# Patient Record
Sex: FEMALE | Race: WHITE | HISPANIC OR LATINO
[De-identification: names, ages, dates, MRNs, and addresses within clinical notes are randomized per-mention and may not be internally consistent; named-entity substitution may affect disease eponyms.]

---

## 2017-01-09 ENCOUNTER — APPOINTMENT (OUTPATIENT)
Dept: INTERNAL MEDICINE | Facility: CLINIC | Age: 32
End: 2017-01-09

## 2017-01-09 VITALS
HEIGHT: 61 IN | WEIGHT: 116 LBS | BODY MASS INDEX: 21.9 KG/M2 | HEART RATE: 70 BPM | DIASTOLIC BLOOD PRESSURE: 75 MMHG | SYSTOLIC BLOOD PRESSURE: 108 MMHG

## 2017-03-23 ENCOUNTER — OUTPATIENT (OUTPATIENT)
Dept: OUTPATIENT SERVICES | Facility: HOSPITAL | Age: 32
LOS: 1 days | Discharge: HOME | End: 2017-03-23

## 2017-03-23 ENCOUNTER — RESULT CHARGE (OUTPATIENT)
Age: 32
End: 2017-03-23

## 2017-03-23 ENCOUNTER — APPOINTMENT (OUTPATIENT)
Dept: OBGYN | Facility: CLINIC | Age: 32
End: 2017-03-23

## 2017-03-23 VITALS
SYSTOLIC BLOOD PRESSURE: 100 MMHG | HEIGHT: 61 IN | WEIGHT: 115 LBS | DIASTOLIC BLOOD PRESSURE: 60 MMHG | BODY MASS INDEX: 21.71 KG/M2

## 2017-03-24 LAB
HCG UR QL: NEGATIVE
QUALITY CONTROL: YES

## 2017-03-30 ENCOUNTER — RX RENEWAL (OUTPATIENT)
Age: 32
End: 2017-03-30

## 2017-03-30 LAB
A VAGINAE DNA VAG NAA+PROBE-LOG#: NOT DETECTED
BV SCORE VAG QL NAA+PROBE: ABNORMAL
C GLABRATA DNA VAG QL NAA+PROBE: NOT DETECTED
C PARAP DNA VAG QL NAA+PROBE: NOT DETECTED
C TRACH RRNA SPEC QL NAA+PROBE: NOT DETECTED
C TROPICLS DNA VAG QL NAA+PROBE: NOT DETECTED
CANDIDA DNA VAG QL NAA+PROBE: NOT DETECTED
G VAGINALIS DNA VAG NAA+PROBE-LOG#: 6.8
LACTOBACILLUS DNA VAG NAA+PROBE-LOG#: 7.6
MEGASPHAERA SP DNA VAG NAA+PROBE-LOG#: NOT DETECTED
N GONORRHOEA RRNA SPEC QL NAA+PROBE: NOT DETECTED
T VAGINALIS RRNA SPEC QL NAA+PROBE: NOT DETECTED

## 2017-04-05 ENCOUNTER — RX RENEWAL (OUTPATIENT)
Age: 32
End: 2017-04-05

## 2017-04-13 ENCOUNTER — APPOINTMENT (OUTPATIENT)
Dept: OBGYN | Facility: CLINIC | Age: 32
End: 2017-04-13

## 2017-04-24 ENCOUNTER — APPOINTMENT (OUTPATIENT)
Dept: INTERNAL MEDICINE | Facility: CLINIC | Age: 32
End: 2017-04-24

## 2017-06-27 DIAGNOSIS — N76.0 ACUTE VAGINITIS: ICD-10-CM

## 2017-07-21 ENCOUNTER — APPOINTMENT (OUTPATIENT)
Dept: OBGYN | Facility: CLINIC | Age: 32
End: 2017-07-21

## 2017-08-03 ENCOUNTER — OUTPATIENT (OUTPATIENT)
Dept: OUTPATIENT SERVICES | Facility: HOSPITAL | Age: 32
LOS: 1 days | Discharge: HOME | End: 2017-08-03

## 2017-08-03 ENCOUNTER — APPOINTMENT (OUTPATIENT)
Dept: OBGYN | Facility: CLINIC | Age: 32
End: 2017-08-03

## 2017-08-03 VITALS
WEIGHT: 118 LBS | DIASTOLIC BLOOD PRESSURE: 60 MMHG | SYSTOLIC BLOOD PRESSURE: 94 MMHG | BODY MASS INDEX: 22.28 KG/M2 | HEIGHT: 61 IN

## 2017-08-03 DIAGNOSIS — L02.31 CUTANEOUS ABSCESS OF BUTTOCK: ICD-10-CM

## 2017-08-03 DIAGNOSIS — L03.90 CELLULITIS, UNSPECIFIED: ICD-10-CM

## 2017-08-04 LAB
BILIRUB UR QL STRIP: NEGATIVE
CLARITY UR: CLEAR
COLLECTION METHOD: NORMAL
GLUCOSE UR-MCNC: NEGATIVE
HCG UR QL: NEGATIVE EU/DL
HGB UR QL STRIP.AUTO: NEGATIVE
KETONES UR-MCNC: NEGATIVE
LEUKOCYTE ESTERASE UR QL STRIP: NEGATIVE
NITRITE UR QL STRIP: NEGATIVE
PH UR STRIP: 6
PROT UR STRIP-MCNC: NEGATIVE
SP GR UR STRIP: 1.02

## 2017-08-05 ENCOUNTER — RESULT REVIEW (OUTPATIENT)
Age: 32
End: 2017-08-05

## 2017-08-09 ENCOUNTER — APPOINTMENT (OUTPATIENT)
Dept: ANTEPARTUM | Facility: CLINIC | Age: 32
End: 2017-08-09

## 2017-08-09 DIAGNOSIS — O36.80X0 PREGNANCY WITH INCONCLUSIVE FETAL VIABILITY, NOT APPLICABLE OR UNSPECIFIED: ICD-10-CM

## 2017-08-09 DIAGNOSIS — Z34.00 ENCOUNTER FOR SUPERVISION OF NORMAL FIRST PREGNANCY, UNSPECIFIED TRIMESTER: ICD-10-CM

## 2017-08-09 LAB
A VAGINAE DNA VAG NAA+PROBE-LOG#: NOT DETECTED
BV SCORE VAG QL NAA+PROBE: ABNORMAL
C GLABRATA DNA VAG QL NAA+PROBE: NOT DETECTED
C PARAP DNA VAG QL NAA+PROBE: NOT DETECTED
C TRACH RRNA SPEC QL NAA+PROBE: NOT DETECTED
C TROPICLS DNA VAG QL NAA+PROBE: NOT DETECTED
CANDIDA DNA VAG QL NAA+PROBE: NOT DETECTED
G VAGINALIS DNA VAG NAA+PROBE-LOG#: 6.2
LACTOBACILLUS DNA VAG NAA+PROBE-LOG#: 7.1
MEGASPHAERA SP DNA VAG NAA+PROBE-LOG#: NOT DETECTED
N GONORRHOEA RRNA SPEC QL NAA+PROBE: NOT DETECTED
T VAGINALIS RRNA SPEC QL NAA+PROBE: NOT DETECTED

## 2017-08-16 LAB
HPV I/H RISK 1 DNA CVX QL PROBE+SIG AMP: DETECTED
HPV16 DNA CVX QL PROBE+SIG AMP: NOT DETECTED
HPV16 DNA CVX QL PROBE+SIG AMP: NOT DETECTED

## 2017-08-23 ENCOUNTER — OUTPATIENT (OUTPATIENT)
Dept: OUTPATIENT SERVICES | Facility: HOSPITAL | Age: 32
LOS: 1 days | Discharge: HOME | End: 2017-08-23

## 2017-08-23 ENCOUNTER — APPOINTMENT (OUTPATIENT)
Dept: ANTEPARTUM | Facility: CLINIC | Age: 32
End: 2017-08-23

## 2017-08-23 DIAGNOSIS — L02.31 CUTANEOUS ABSCESS OF BUTTOCK: ICD-10-CM

## 2017-08-23 DIAGNOSIS — L03.90 CELLULITIS, UNSPECIFIED: ICD-10-CM

## 2017-08-24 ENCOUNTER — APPOINTMENT (OUTPATIENT)
Dept: OBGYN | Facility: CLINIC | Age: 32
End: 2017-08-24

## 2017-08-24 ENCOUNTER — OUTPATIENT (OUTPATIENT)
Dept: OUTPATIENT SERVICES | Facility: HOSPITAL | Age: 32
LOS: 1 days | Discharge: HOME | End: 2017-08-24

## 2017-08-24 DIAGNOSIS — L03.90 CELLULITIS, UNSPECIFIED: ICD-10-CM

## 2017-08-24 DIAGNOSIS — L02.31 CUTANEOUS ABSCESS OF BUTTOCK: ICD-10-CM

## 2017-08-28 ENCOUNTER — OUTPATIENT (OUTPATIENT)
Dept: OUTPATIENT SERVICES | Facility: HOSPITAL | Age: 32
LOS: 1 days | Discharge: HOME | End: 2017-08-28

## 2017-08-28 DIAGNOSIS — L03.90 CELLULITIS, UNSPECIFIED: ICD-10-CM

## 2017-08-28 DIAGNOSIS — L02.31 CUTANEOUS ABSCESS OF BUTTOCK: ICD-10-CM

## 2017-08-31 ENCOUNTER — APPOINTMENT (OUTPATIENT)
Dept: OBGYN | Facility: CLINIC | Age: 32
End: 2017-08-31

## 2017-09-01 DIAGNOSIS — Z3A.08 8 WEEKS GESTATION OF PREGNANCY: ICD-10-CM

## 2017-09-01 DIAGNOSIS — N85.4 MALPOSITION OF UTERUS: ICD-10-CM

## 2017-09-01 DIAGNOSIS — O02.1 MISSED ABORTION: ICD-10-CM

## 2017-09-11 ENCOUNTER — RESULT REVIEW (OUTPATIENT)
Age: 32
End: 2017-09-11

## 2017-09-11 LAB
ABO + RH BLD: NORMAL
BASOPHILS # BLD: 0.01 TH/MM3
BASOPHILS NFR BLD: 0.1 %
BLD GP AB SCN SERPL QL: NEGATIVE
DIFFERENTIAL METHOD BLD: NORMAL
EOSINOPHIL # BLD: 0.22 TH/MM3
EOSINOPHIL NFR BLD: 3.2 %
ERYTHROCYTE [DISTWIDTH] IN BLOOD BY AUTOMATED COUNT: 14.9 %
GRANULOCYTES # BLD: 4.33 TH/MM3
GRANULOCYTES NFR BLD: 62.2 %
HCT VFR BLD AUTO: 39.4 %
HGB BLD-MCNC: 12.7 G/DL
IMM GRANULOCYTES # BLD: 0.02 TH/MM3
IMM GRANULOCYTES NFR BLD: 0.3 %
LYMPHOCYTES # BLD: 1.9 TH/MM3
LYMPHOCYTES NFR BLD: 27.3 %
MCH RBC QN AUTO: 29.5 PG
MCHC RBC AUTO-ENTMCNC: 32.2 G/DL
MCV RBC AUTO: 91.4 FL
MONOCYTES # BLD: 0.48 TH/MM3
MONOCYTES NFR BLD: 6.9 %
PLATELET # BLD: 254 TH/MM3
PMV BLD AUTO: 9.7 FL
RBC # BLD AUTO: 4.31 MIL/MM3
WBC # BLD: 6.96 TH/MM3

## 2017-09-12 ENCOUNTER — APPOINTMENT (OUTPATIENT)
Dept: OBGYN | Facility: CLINIC | Age: 32
End: 2017-09-12

## 2017-09-12 ENCOUNTER — OUTPATIENT (OUTPATIENT)
Dept: OUTPATIENT SERVICES | Facility: HOSPITAL | Age: 32
LOS: 1 days | Discharge: HOME | End: 2017-09-12

## 2017-09-12 VITALS — DIASTOLIC BLOOD PRESSURE: 60 MMHG | BODY MASS INDEX: 22.3 KG/M2 | SYSTOLIC BLOOD PRESSURE: 100 MMHG | WEIGHT: 118 LBS

## 2017-09-12 DIAGNOSIS — L03.90 CELLULITIS, UNSPECIFIED: ICD-10-CM

## 2017-09-12 DIAGNOSIS — L02.31 CUTANEOUS ABSCESS OF BUTTOCK: ICD-10-CM

## 2018-03-06 ENCOUNTER — OUTPATIENT (OUTPATIENT)
Dept: OUTPATIENT SERVICES | Facility: HOSPITAL | Age: 33
LOS: 1 days | Discharge: HOME | End: 2018-03-06

## 2018-03-06 ENCOUNTER — APPOINTMENT (OUTPATIENT)
Dept: OBGYN | Facility: CLINIC | Age: 33
End: 2018-03-06

## 2018-03-06 VITALS
BODY MASS INDEX: 23.06 KG/M2 | SYSTOLIC BLOOD PRESSURE: 100 MMHG | DIASTOLIC BLOOD PRESSURE: 70 MMHG | WEIGHT: 122.13 LBS | HEIGHT: 61 IN

## 2018-03-06 RX ADMIN — NORGESTIMATE AND ETHINYL ESTRADIOL 1 MG-25 MCG: KIT at 00:00

## 2018-03-07 DIAGNOSIS — Z30.9 ENCOUNTER FOR CONTRACEPTIVE MANAGEMENT, UNSPECIFIED: ICD-10-CM

## 2018-08-07 ENCOUNTER — OUTPATIENT (OUTPATIENT)
Dept: OUTPATIENT SERVICES | Facility: HOSPITAL | Age: 33
LOS: 1 days | Discharge: HOME | End: 2018-08-07

## 2018-08-07 ENCOUNTER — APPOINTMENT (OUTPATIENT)
Dept: OBGYN | Facility: CLINIC | Age: 33
End: 2018-08-07

## 2018-08-07 VITALS
DIASTOLIC BLOOD PRESSURE: 72 MMHG | WEIGHT: 122 LBS | HEIGHT: 61 IN | BODY MASS INDEX: 23.03 KG/M2 | SYSTOLIC BLOOD PRESSURE: 120 MMHG

## 2018-08-08 DIAGNOSIS — Z01.419 ENCOUNTER FOR GYNECOLOGICAL EXAMINATION (GENERAL) (ROUTINE) WITHOUT ABNORMAL FINDINGS: ICD-10-CM

## 2018-08-14 LAB — HPV HIGH+LOW RISK DNA PNL CVX: NOT DETECTED

## 2018-08-21 ENCOUNTER — APPOINTMENT (OUTPATIENT)
Dept: ANTEPARTUM | Facility: CLINIC | Age: 33
End: 2018-08-21

## 2018-08-21 DIAGNOSIS — R10.2 PELVIC AND PERINEAL PAIN: ICD-10-CM

## 2018-08-21 DIAGNOSIS — D25.1 INTRAMURAL LEIOMYOMA OF UTERUS: ICD-10-CM

## 2018-08-21 DIAGNOSIS — D25.2 SUBSEROSAL LEIOMYOMA OF UTERUS: ICD-10-CM

## 2018-08-22 ENCOUNTER — OUTPATIENT (OUTPATIENT)
Dept: OUTPATIENT SERVICES | Facility: HOSPITAL | Age: 33
LOS: 1 days | Discharge: HOME | End: 2018-08-22

## 2018-08-22 ENCOUNTER — APPOINTMENT (OUTPATIENT)
Dept: INTERNAL MEDICINE | Facility: CLINIC | Age: 33
End: 2018-08-22

## 2018-08-22 VITALS
HEART RATE: 77 BPM | BODY MASS INDEX: 23.03 KG/M2 | WEIGHT: 122 LBS | TEMPERATURE: 98.1 F | HEIGHT: 61 IN | SYSTOLIC BLOOD PRESSURE: 103 MMHG | DIASTOLIC BLOOD PRESSURE: 58 MMHG

## 2018-08-22 DIAGNOSIS — L03.317 CELLULITIS OF BUTTOCK: ICD-10-CM

## 2018-08-22 DIAGNOSIS — L02.439 CARBUNCLE OF LIMB, UNSPECIFIED: ICD-10-CM

## 2018-08-22 RX ORDER — NORGESTIMATE AND ETHINYL ESTRADIOL 7DAYSX3 LO
0.18/0.215/0.25 KIT ORAL DAILY
Qty: 28 | Refills: 5 | Status: DISCONTINUED | OUTPATIENT
Start: 2017-09-12 | End: 2018-08-22

## 2018-08-22 RX ORDER — MULTIVITAMIN
TABLET ORAL
Refills: 0 | Status: ACTIVE | COMMUNITY

## 2018-08-22 RX ORDER — TERCONAZOLE 4 MG/G
0.4 CREAM VAGINAL
Qty: 1 | Refills: 0 | Status: DISCONTINUED | COMMUNITY
Start: 2017-03-23 | End: 2018-08-22

## 2018-08-22 RX ORDER — NORGESTIMATE AND ETHINYL ESTRADIOL 7DAYSX3 LO
0.18/0.215/0.25 KIT ORAL DAILY
Qty: 1 | Refills: 5 | Status: DISCONTINUED | COMMUNITY
Start: 2018-08-07 | End: 2018-08-22

## 2018-08-22 NOTE — REVIEW OF SYSTEMS
[Negative] : Heme/Lymph [FreeTextEntry4] : food feels stuck in the thraot [FreeTextEntry9] : facial itching with tylenol

## 2018-08-22 NOTE — HISTORY OF PRESENT ILLNESS
[de-identified] : 33  yoF with pmhx of recurrent vulvovaginitis, miscarriage X 1 presents for follow up. Pt c/o dizziness, describes as spinning sensation three weeks ago when she gets up in the morning and she bends down which resolved spontaneously. erythematous and pruritus rash on the neck two weeks ago. Rash resolved with Claritin. \par Pt c/o that she had a cold two months ago, took Nightquail and developed facial itching. Pt also had facial itching with Tylenol. Pt had these medication before without problem but recently she developed facial itching when taking these medications. \par Pt also c/o feeling food is stuck in her throat occasionally. \par Pt denies fever, chills, headache, dizziness, CP, SOB, abd pain, Vomiting, diarrhea, dysuria. \par \par \par \par

## 2018-08-22 NOTE — ASSESSMENT
[FreeTextEntry1] : \par 34 yo F with pmhx as listed presents for follow up. \par \par 1. allergic reaction to tylenol, rash on neck resolved with claritin\par - send to allergist\par \par 2. Dizziness likely 2/2 viral infection\par - resolved\par \par 3. HCM\par - routine labs\par - UTD with pap smear\par

## 2018-08-22 NOTE — PHYSICAL EXAM
[No Acute Distress] : no acute distress [Well Nourished] : well nourished [Well Developed] : well developed [Well-Appearing] : well-appearing [Normal Sclera/Conjunctiva] : normal sclera/conjunctiva [PERRL] : pupils equal round and reactive to light [EOMI] : extraocular movements intact [Normal Outer Ear/Nose] : the outer ears and nose were normal in appearance [Normal Oropharynx] : the oropharynx was normal [No JVD] : no jugular venous distention [Supple] : supple [No Lymphadenopathy] : no lymphadenopathy [Thyroid Normal, No Nodules] : the thyroid was normal and there were no nodules present [No Respiratory Distress] : no respiratory distress  [Clear to Auscultation] : lungs were clear to auscultation bilaterally [No Accessory Muscle Use] : no accessory muscle use [Normal Rate] : normal rate  [Regular Rhythm] : with a regular rhythm [Normal S1, S2] : normal S1 and S2 [No Murmur] : no murmur heard [No Carotid Bruits] : no carotid bruits [No Edema] : there was no peripheral edema [No Extremity Clubbing/Cyanosis] : no extremity clubbing/cyanosis [Soft] : abdomen soft [Non Tender] : non-tender [Non-distended] : non-distended [No Masses] : no abdominal mass palpated [No HSM] : no HSM [Normal Bowel Sounds] : normal bowel sounds [Normal Anterior Cervical Nodes] : no anterior cervical lymphadenopathy [No CVA Tenderness] : no CVA  tenderness [No Joint Swelling] : no joint swelling [Grossly Normal Strength/Tone] : grossly normal strength/tone [No Rash] : no rash [Normal Gait] : normal gait [Coordination Grossly Intact] : coordination grossly intact [No Focal Deficits] : no focal deficits [Deep Tendon Reflexes (DTR)] : deep tendon reflexes were 2+ and symmetric [Normal Affect] : the affect was normal [Normal Insight/Judgement] : insight and judgment were intact

## 2018-08-23 ENCOUNTER — LABORATORY RESULT (OUTPATIENT)
Age: 33
End: 2018-08-23

## 2018-08-23 LAB — HIV1+2 AB SPEC QL IA.RAPID: NONREACTIVE

## 2018-08-24 LAB
ALBUMIN SERPL ELPH-MCNC: 4.3 G/DL
ALP BLD-CCNC: 55 U/L
ALT SERPL-CCNC: 18 U/L
ANION GAP SERPL CALC-SCNC: 17 MMOL/L
AST SERPL-CCNC: 22 U/L
BASOPHILS # BLD AUTO: 0.01 K/UL
BASOPHILS NFR BLD AUTO: 0.2 %
BILIRUB SERPL-MCNC: 0.4 MG/DL
BUN SERPL-MCNC: 10 MG/DL
CALCIUM SERPL-MCNC: 9.9 MG/DL
CHLORIDE SERPL-SCNC: 101 MMOL/L
CHOLEST SERPL-MCNC: 189 MG/DL
CHOLEST/HDLC SERPL: 2.6 RATIO
CO2 SERPL-SCNC: 22 MMOL/L
CREAT SERPL-MCNC: 0.6 MG/DL
EOSINOPHIL # BLD AUTO: 0.13 K/UL
EOSINOPHIL NFR BLD AUTO: 2 %
GLUCOSE SERPL-MCNC: 86 MG/DL
HCT VFR BLD CALC: 40.4 %
HDLC SERPL-MCNC: 73 MG/DL
HGB BLD-MCNC: 12.6 G/DL
IMM GRANULOCYTES NFR BLD AUTO: 0.6 %
LDLC SERPL CALC-MCNC: 101 MG/DL
LYMPHOCYTES # BLD AUTO: 1.96 K/UL
LYMPHOCYTES NFR BLD AUTO: 30.5 %
MAN DIFF?: NORMAL
MCHC RBC-ENTMCNC: 29.2 PG
MCHC RBC-ENTMCNC: 31.2 G/DL
MCV RBC AUTO: 93.7 FL
MONOCYTES # BLD AUTO: 0.31 K/UL
MONOCYTES NFR BLD AUTO: 4.8 %
NEUTROPHILS # BLD AUTO: 3.97 K/UL
NEUTROPHILS NFR BLD AUTO: 61.9 %
PLATELET # BLD AUTO: 248 K/UL
POTASSIUM SERPL-SCNC: 4.4 MMOL/L
PROT SERPL-MCNC: 7 G/DL
RBC # BLD: 4.31 M/UL
RBC # FLD: 13.7 %
SODIUM SERPL-SCNC: 140 MMOL/L
TRIGL SERPL-MCNC: 169 MG/DL
TSH SERPL-ACNC: 1.81 UIU/ML
WBC # FLD AUTO: 6.42 K/UL

## 2018-09-04 ENCOUNTER — APPOINTMENT (OUTPATIENT)
Dept: OBGYN | Facility: CLINIC | Age: 33
End: 2018-09-04

## 2018-09-04 ENCOUNTER — OUTPATIENT (OUTPATIENT)
Dept: OUTPATIENT SERVICES | Facility: HOSPITAL | Age: 33
LOS: 1 days | Discharge: HOME | End: 2018-09-04

## 2018-09-04 VITALS
BODY MASS INDEX: 23.03 KG/M2 | WEIGHT: 122.01 LBS | DIASTOLIC BLOOD PRESSURE: 62 MMHG | HEIGHT: 61 IN | SYSTOLIC BLOOD PRESSURE: 100 MMHG

## 2018-09-04 DIAGNOSIS — O02.1 MISSED ABORTION: ICD-10-CM

## 2018-09-04 DIAGNOSIS — Z01.419 ENCOUNTER FOR GYNECOLOGICAL EXAMINATION (GENERAL) (ROUTINE) W/OUT ABNORMAL FINDINGS: ICD-10-CM

## 2018-09-05 DIAGNOSIS — Z30.9 ENCOUNTER FOR CONTRACEPTIVE MANAGEMENT, UNSPECIFIED: ICD-10-CM

## 2018-09-05 PROBLEM — Z01.419 WELL WOMAN EXAM WITH ROUTINE GYNECOLOGICAL EXAM: Status: RESOLVED | Noted: 2018-08-07 | Resolved: 2018-09-05

## 2018-09-05 PROBLEM — O02.1 MISSED ABORTION: Status: RESOLVED | Noted: 2017-08-24 | Resolved: 2018-09-05

## 2018-10-31 ENCOUNTER — OUTPATIENT (OUTPATIENT)
Dept: OUTPATIENT SERVICES | Facility: HOSPITAL | Age: 33
LOS: 1 days | Discharge: HOME | End: 2018-10-31

## 2018-10-31 ENCOUNTER — APPOINTMENT (OUTPATIENT)
Dept: INTERNAL MEDICINE | Facility: CLINIC | Age: 33
End: 2018-10-31

## 2018-10-31 VITALS
HEIGHT: 61 IN | DIASTOLIC BLOOD PRESSURE: 71 MMHG | HEART RATE: 75 BPM | BODY MASS INDEX: 23.6 KG/M2 | SYSTOLIC BLOOD PRESSURE: 103 MMHG | TEMPERATURE: 97 F | WEIGHT: 125 LBS

## 2018-10-31 NOTE — HISTORY OF PRESENT ILLNESS
[FreeTextEntry1] : routine follow up [de-identified] : 34 yo female with h/o prediabetes comes for routine follow up.\par she wants to know the result of blood test which were drawn in the previous visit.\par she doesnot have any present complaints except the persistent c/o food sticking on the throat.\par agrees for flu shot.\par LMP 2 weeks back.

## 2018-10-31 NOTE — ASSESSMENT
[FreeTextEntry1] : 34 yo female with h/o prediabetes comes for routine follow up.\par she wants to know the result of blood test which were drawn in the previous visit.\par she doesnot have any present complaints except the persistent c/o food sticking on the throat, no regurgitation.\par agrees for flu shot.\par LMP 2 weeks back.\par 1)prediabetes a1c5.3 [id number] refer to dietician\par Dysphagia:\par refer to gastroenterology\par \par HCM\par flu shot\par \par rto in 6 months and prn\par \par Prediabetes\par dietitian\par repeat labs\par lifestyle modification\par \par

## 2018-11-01 DIAGNOSIS — R73.03 PREDIABETES: ICD-10-CM

## 2018-11-01 DIAGNOSIS — R13.19 OTHER DYSPHAGIA: ICD-10-CM

## 2018-11-01 DIAGNOSIS — E78.1 PURE HYPERGLYCERIDEMIA: ICD-10-CM

## 2018-11-12 ENCOUNTER — APPOINTMENT (OUTPATIENT)
Dept: NUTRITION | Facility: CLINIC | Age: 33
End: 2018-11-12

## 2018-11-12 VITALS — HEIGHT: 61 IN | BODY MASS INDEX: 23.6 KG/M2 | WEIGHT: 125 LBS

## 2018-12-21 ENCOUNTER — APPOINTMENT (OUTPATIENT)
Dept: GASTROENTEROLOGY | Facility: CLINIC | Age: 33
End: 2018-12-21

## 2019-03-05 ENCOUNTER — OUTPATIENT (OUTPATIENT)
Dept: OUTPATIENT SERVICES | Facility: HOSPITAL | Age: 34
LOS: 1 days | Discharge: HOME | End: 2019-03-05

## 2019-03-05 ENCOUNTER — APPOINTMENT (OUTPATIENT)
Dept: OBGYN | Facility: CLINIC | Age: 34
End: 2019-03-05

## 2019-03-05 VITALS — BODY MASS INDEX: 24 KG/M2 | WEIGHT: 127 LBS | SYSTOLIC BLOOD PRESSURE: 110 MMHG | DIASTOLIC BLOOD PRESSURE: 60 MMHG

## 2019-03-06 NOTE — PHYSICAL EXAM
[Normal] : uterus [Labia Majora] : labia major [Labia Minora] : labia minora [No Bleeding] : there was no active vaginal bleeding [Uterine Adnexae] : were not tender and not enlarged [Discharge] : had no discharge [Motion Tenderness] : there was no cervical motion tenderness

## 2019-03-08 DIAGNOSIS — N92.6 IRREGULAR MENSTRUATION, UNSPECIFIED: ICD-10-CM

## 2019-03-18 ENCOUNTER — APPOINTMENT (OUTPATIENT)
Dept: ANTEPARTUM | Facility: CLINIC | Age: 34
End: 2019-03-18

## 2019-03-19 ENCOUNTER — APPOINTMENT (OUTPATIENT)
Dept: OBGYN | Facility: CLINIC | Age: 34
End: 2019-03-19

## 2019-03-19 ENCOUNTER — LABORATORY RESULT (OUTPATIENT)
Age: 34
End: 2019-03-19

## 2019-03-19 ENCOUNTER — OUTPATIENT (OUTPATIENT)
Dept: OUTPATIENT SERVICES | Facility: HOSPITAL | Age: 34
LOS: 1 days | Discharge: HOME | End: 2019-03-19

## 2019-03-19 ENCOUNTER — RESULT CHARGE (OUTPATIENT)
Age: 34
End: 2019-03-19

## 2019-03-19 VITALS
HEIGHT: 61 IN | SYSTOLIC BLOOD PRESSURE: 122 MMHG | DIASTOLIC BLOOD PRESSURE: 78 MMHG | BODY MASS INDEX: 23.98 KG/M2 | WEIGHT: 127 LBS

## 2019-03-19 DIAGNOSIS — R10.2 PELVIC AND PERINEAL PAIN: ICD-10-CM

## 2019-03-19 LAB — TSH SERPL-ACNC: 1.58 UIU/ML

## 2019-03-19 NOTE — PROCEDURE
[Endometrial Biopsy] : Endometrial biopsy [Irregular Bleeding] : irregular uterine bleeding [Risks] : risks [Benefits] : benefits [Alternatives] : alternatives [Patient] : patient [Infection] : infection [Bleeding] : bleeding [Allergic Reaction] : allergic reaction [Uterine Perforation] : uterine perforation [Pain] : pain [CONSENT OBTAINED] : written consent was obtained prior to the procedure. [LMP ___] : LMP was [unfilled] [Neg Pregnancy Test] : a pregnancy test was negative [Betadine] : Betadine [None] : none [Easy Passage] : allowed easy passage of a uterine sound without dilation [Sounded to ___ cm] : sounded to [unfilled] ~Ucm [Anteverted] : anteverted [Pipelle] : a Pipelle endometrial suction curette [Scant] : a scant [Sent to Histology] : the specimen was place in buffered formalin and sent for pathlogy [Tolerated Well] : the patient tolerated the procedure well [No Complications] : there were no complications [de-identified] : None

## 2019-03-20 DIAGNOSIS — N93.9 ABNORMAL UTERINE AND VAGINAL BLEEDING, UNSPECIFIED: ICD-10-CM

## 2019-03-20 DIAGNOSIS — N92.6 IRREGULAR MENSTRUATION, UNSPECIFIED: ICD-10-CM

## 2019-03-20 LAB
HCG UR QL: NEGATIVE
QUALITY CONTROL: YES

## 2019-04-02 ENCOUNTER — APPOINTMENT (OUTPATIENT)
Dept: OBGYN | Facility: CLINIC | Age: 34
End: 2019-04-02

## 2019-04-02 ENCOUNTER — OUTPATIENT (OUTPATIENT)
Dept: OUTPATIENT SERVICES | Facility: HOSPITAL | Age: 34
LOS: 1 days | Discharge: HOME | End: 2019-04-02

## 2019-04-02 VITALS
BODY MASS INDEX: 23.6 KG/M2 | DIASTOLIC BLOOD PRESSURE: 80 MMHG | HEIGHT: 61 IN | SYSTOLIC BLOOD PRESSURE: 124 MMHG | WEIGHT: 125.02 LBS

## 2019-04-02 DIAGNOSIS — N76.0 ACUTE VAGINITIS: ICD-10-CM

## 2019-04-02 NOTE — PHYSICAL EXAM
Message   Recorded as Task   Date: 09/01/2016 12:08 PM, Created By: Angela Day   Task Name: Follow Up   Assigned To: Glad to Have You File   Regarding Patient: Magi Nayak, Status: In Progress   CommentCosimo Knife - 01 Sep 2016 12:08 PM     TASK CREATED  There is a small cyst on the left ovary which appears consistent with corpus luteum - this is benign and associated with recent ovulation  Despite the cyst being small it may be the cause of her LLQ discomfort  Other possible causes may include GI etiology such as constipation, as we discussed at her office visit  She may use NSAIDs for pain as needed and should call if sx become more bothersome, and report to the ED if sx are severe  I will mail order slip for f/u US, to be performed in 2 mos  Please notify patient of results by phone and review reasons to call  Thanks! Kendrick Mask - 01 Sep 2016 12:25 PM     TASK IN PROGRESS   Kendrick Mask - 01 Sep 2016 12:26 PM     TASK EDITED   lmtcb   Rosanna Lou - 21 Sep 2016 8:49 AM     TASK EDITED  lm forpt tcb for ander's information   Andreina Gerardo - 02 Sep 2016 9:25 AM     TASK EDITED                 reviewed results and discussed recommendations  u/s mailed to patient  Active Problems    1  Encounter for gynecological examination without abnormal finding (V72 31) (Z01 419)   2  Encounter for screening mammogram for malignant neoplasm of breast (V76 12)   (Z12 31)   3  Essential hypertension (401 9) (I10)   4  Fatigue (780 79) (R53 83)   5  Herniated nucleus pulposus, L4-5 (722 10) (M51 26)   6  Hypokalemia (276 8) (E87 6)   7  Influenza vaccine needed (V04 81) (Z23)   8  Left lower quadrant pain (789 04) (R10 32)   9  Lumbar radiculopathy (724 4) (M54 16)   10  Malodorous urine (791 9) (R82 90)   11  Medial epicondylitis (726 31) (M77 00)   12  Need for prophylactic vaccination and inoculation against influenza (V04 81) (Z23)   13  Obesity (278 00) (E66 9)   14   Ovarian cyst, right (620  2) (N83 20)   15  Pigmented nevus (216 9) (D22 9)   16  Sacroiliitis (720 2) (M46 1)   17  Screening for lipoid disorders (V77 91) (Z13 220)   18  Tuberculosis screening (V74 1) (Z11 1)   19  Vaginal candidiasis (112 1) (B37 3)   20  Vitamin D deficiency (268 9) (E55 9)    Current Meds   1  AmLODIPine Besylate 2 5 MG Oral Tablet; TAKE 1 TABLET DAILY AS DIRECTED; Therapy: 07NWK6532 to (Evaluate:21May2017)  Requested for: 92AIR4164; Last   Rx:[id number] Ordered   2  EQL Vitamin D3 1000 UNIT Oral Tablet; Therapy: (Recorded:24Oct2013) to Recorded   3  Fluconazole 150 MG Oral Tablet; diflucan 150mg,,,take 1 today and repeat in 3 days; Therapy: 46Nex8620 to (Evaluate:82Toc1140); Last Rx:[id number] Ordered   4  Lisinopril-Hydrochlorothiazide 20-25 MG Oral Tablet; TAKE 1 TABLET DAILY; Therapy: 05UGJ2380 to (Evaluate:21May2017)  Requested for: 93FWB7963; Last   Rx:[id number] Ordered   5  Meloxicam 7 5 MG Oral Tablet; TAKE 1 TABLET DAILY AS NEEDED; Therapy: 24Tsp0863 to (Grazyna De Leon)  Requested for: 42Wwm8115; Last   Rx:[id number] Ordered   6  Nitrofurantoin Monohyd Macro 100 MG Oral Capsule (Macrobid); TAKE 1 CAPSULE   EVERY 12 HOURS DAILY; Therapy: 30ZDI2341 to (Evaluate:07Yoj6858); Last Rx:[id number] Ordered   7  Potassium Chloride ER 10 MEQ Oral Capsule Extended Release; TAKE 1 CAPSULE   DAILY; Therapy: 11INC1992 to (Evaluate:02Upv4267)  Requested for: 22Vmb0090; Last   Rx:[id number] Ordered    Allergies    1  No Known Drug Allergies    2   Seasonal    Signatures   Electronically signed by : Gracie Dunn LPN; Sep  2 0174  9:68XF EST                       (Author) [Normal] : uterus [Discharge] : a  ~M vaginal discharge was present [Moderate] : moderate [White] : white [Thick] : thick [No Bleeding] : there was no active vaginal bleeding [Uterine Adnexae] : were not tender and not enlarged

## 2019-04-05 DIAGNOSIS — N93.9 ABNORMAL UTERINE AND VAGINAL BLEEDING, UNSPECIFIED: ICD-10-CM

## 2019-04-05 DIAGNOSIS — B37.9 CANDIDIASIS, UNSPECIFIED: ICD-10-CM

## 2019-04-10 LAB
A VAGINAE DNA VAG QL NAA+PROBE: ABNORMAL
BVAB2 DNA VAG QL NAA+PROBE: NORMAL
C KRUSEI DNA VAG QL NAA+PROBE: NEGATIVE
C TRACH RRNA SPEC QL NAA+PROBE: NEGATIVE
MEGA1 DNA VAG QL NAA+PROBE: ABNORMAL
N GONORRHOEA RRNA SPEC QL NAA+PROBE: NEGATIVE
T VAGINALIS RRNA SPEC QL NAA+PROBE: NEGATIVE

## 2019-04-15 ENCOUNTER — APPOINTMENT (OUTPATIENT)
Dept: NUTRITION | Facility: CLINIC | Age: 34
End: 2019-04-15

## 2019-04-22 ENCOUNTER — APPOINTMENT (OUTPATIENT)
Dept: INTERNAL MEDICINE | Facility: CLINIC | Age: 34
End: 2019-04-22

## 2019-08-13 ENCOUNTER — APPOINTMENT (OUTPATIENT)
Dept: OBGYN | Facility: CLINIC | Age: 34
End: 2019-08-13

## 2019-10-23 LAB
HCG UR QL: NEGATIVE
QUALITY CONTROL: YES

## 2019-11-21 ENCOUNTER — APPOINTMENT (OUTPATIENT)
Dept: OBGYN | Facility: CLINIC | Age: 34
End: 2019-11-21
Payer: COMMERCIAL

## 2019-11-21 ENCOUNTER — OUTPATIENT (OUTPATIENT)
Dept: OUTPATIENT SERVICES | Facility: HOSPITAL | Age: 34
LOS: 1 days | Discharge: HOME | End: 2019-11-21

## 2019-11-21 VITALS
BODY MASS INDEX: 23.79 KG/M2 | SYSTOLIC BLOOD PRESSURE: 126 MMHG | WEIGHT: 126 LBS | HEIGHT: 61 IN | DIASTOLIC BLOOD PRESSURE: 78 MMHG

## 2019-11-21 DIAGNOSIS — Z87.42 PERSONAL HISTORY OF OTHER DISEASES OF THE FEMALE GENITAL TRACT: ICD-10-CM

## 2019-11-21 PROCEDURE — 99395 PREV VISIT EST AGE 18-39: CPT

## 2019-11-21 RX ORDER — FLUCONAZOLE 150 MG/1
150 TABLET ORAL
Qty: 2 | Refills: 1 | Status: DISCONTINUED | COMMUNITY
Start: 2019-04-02 | End: 2019-11-21

## 2019-11-21 RX ORDER — NORGESTIMATE AND ETHINYL ESTRADIOL 7DAYSX3 LO
KIT ORAL
Refills: 0 | Status: DISCONTINUED | COMMUNITY
End: 2019-11-21

## 2019-11-21 NOTE — PHYSICAL EXAM
[Awake] : awake [Alert] : alert [Soft] : soft [Oriented x3] : oriented to person, place, and time [Labia Majora] : labia major [Labia Minora] : labia minora [Normal] : clitoris [No Bleeding] : there was no active vaginal bleeding [Normal Position] : in a normal position [Uterine Adnexae] : were not tender and not enlarged [RRR, No Murmurs] : RRR, no murmurs [Acute Distress] : no acute distress [LAD] : no lymphadenopathy [Thyroid Nodule] : no thyroid nodule [Goiter] : no goiter [Mass] : no breast mass [Nipple Discharge] : no nipple discharge [Axillary LAD] : no axillary lymphadenopathy [Tender] : non tender [Distended] : not distended [H/Smegaly] : no hepatosplenomegaly [Depressed Mood] : not depressed [Flat Affect] : affect not flat [Discharge] : had no discharge [Motion Tenderness] : there was no cervical motion tenderness [Tenderness] : nontender

## 2019-11-21 NOTE — HISTORY OF PRESENT ILLNESS
[Definite:  ___ (Date)] : the last menstrual period was [unfilled] [Sexually Active] : is sexually active [Monogamous] : is monogamous [Contraception] : uses contraception [Condoms] : uses condoms [Male ___] : [unfilled] male [Spotting Between  Menses] : no spotting between menses

## 2019-11-26 DIAGNOSIS — Z01.419 ENCOUNTER FOR GYNECOLOGICAL EXAMINATION (GENERAL) (ROUTINE) WITHOUT ABNORMAL FINDINGS: ICD-10-CM

## 2019-11-26 DIAGNOSIS — F32.9 MAJOR DEPRESSIVE DISORDER, SINGLE EPISODE, UNSPECIFIED: ICD-10-CM

## 2020-06-16 ENCOUNTER — OUTPATIENT (OUTPATIENT)
Dept: OUTPATIENT SERVICES | Facility: HOSPITAL | Age: 35
LOS: 1 days | Discharge: HOME | End: 2020-06-16

## 2020-06-16 ENCOUNTER — APPOINTMENT (OUTPATIENT)
Dept: OBGYN | Facility: CLINIC | Age: 35
End: 2020-06-16
Payer: COMMERCIAL

## 2020-06-16 VITALS
BODY MASS INDEX: 23.6 KG/M2 | SYSTOLIC BLOOD PRESSURE: 120 MMHG | HEIGHT: 61 IN | WEIGHT: 125 LBS | DIASTOLIC BLOOD PRESSURE: 74 MMHG

## 2020-06-16 DIAGNOSIS — Z01.419 ENCOUNTER FOR GYNECOLOGICAL EXAMINATION (GENERAL) (ROUTINE) W/OUT ABNORMAL FINDINGS: ICD-10-CM

## 2020-06-16 DIAGNOSIS — Z86.59 PERSONAL HISTORY OF OTHER MENTAL AND BEHAVIORAL DISORDERS: ICD-10-CM

## 2020-06-16 DIAGNOSIS — B37.3 CANDIDIASIS OF VULVA AND VAGINA: ICD-10-CM

## 2020-06-16 DIAGNOSIS — Z12.72 ENCOUNTER FOR SCREENING FOR MALIGNANT NEOPLASM OF VAGINA: ICD-10-CM

## 2020-06-16 DIAGNOSIS — N76.0 ACUTE VAGINITIS: ICD-10-CM

## 2020-06-16 DIAGNOSIS — Z30.41 ENCOUNTER FOR SURVEILLANCE OF CONTRACEPTIVE PILLS: ICD-10-CM

## 2020-06-16 DIAGNOSIS — R10.2 PELVIC AND PERINEAL PAIN: ICD-10-CM

## 2020-06-16 DIAGNOSIS — Z87.42 PERSONAL HISTORY OF OTHER DISEASES OF THE FEMALE GENITAL TRACT: ICD-10-CM

## 2020-06-16 DIAGNOSIS — Z88.9 ALLERGY STATUS TO UNSPECIFIED DRUGS, MEDICAMENTS AND BIOLOGICAL SUBSTANCES: ICD-10-CM

## 2020-06-16 PROCEDURE — 99212 OFFICE O/P EST SF 10 MIN: CPT

## 2020-09-01 ENCOUNTER — APPOINTMENT (OUTPATIENT)
Dept: OBGYN | Facility: CLINIC | Age: 35
End: 2020-09-01
Payer: COMMERCIAL

## 2020-09-01 ENCOUNTER — OUTPATIENT (OUTPATIENT)
Dept: OUTPATIENT SERVICES | Facility: HOSPITAL | Age: 35
LOS: 1 days | Discharge: HOME | End: 2020-09-01

## 2020-09-01 ENCOUNTER — RESULT CHARGE (OUTPATIENT)
Age: 35
End: 2020-09-01

## 2020-09-01 VITALS
WEIGHT: 127 LBS | HEIGHT: 61 IN | BODY MASS INDEX: 23.98 KG/M2 | SYSTOLIC BLOOD PRESSURE: 108 MMHG | DIASTOLIC BLOOD PRESSURE: 70 MMHG

## 2020-09-01 DIAGNOSIS — B96.89 ACUTE VAGINITIS: ICD-10-CM

## 2020-09-01 DIAGNOSIS — N76.0 ACUTE VAGINITIS: ICD-10-CM

## 2020-09-01 LAB
HCG UR QL: NEGATIVE
QUALITY CONTROL: YES

## 2020-09-01 PROCEDURE — 99212 OFFICE O/P EST SF 10 MIN: CPT

## 2020-09-01 RX ADMIN — NORETHINDRONE AND ETHINYL ESTRADIOL 0 MG-MCG: 1; .035 TABLET ORAL at 00:00

## 2020-09-01 NOTE — COUNSELING
[Breast Self Exam] : breast self exam [Exercise] : exercise [Vitamins/Supplements] : vitamins/supplements [STD (testing, results, tx)] : STD (testing, results, tx) [Contraception] : contraception [Emergency Contraception] : emergency contraception [Method Specific Consent] : method specific consent

## 2020-09-01 NOTE — HISTORY OF PRESENT ILLNESS
[Definite:  ___ (Date)] : the last menstrual period was [unfilled] [Normal Amount/Duration] : was of a normal amount and duration [Regular Cycle Intervals] : periods have been regular [Sexually Active] : is sexually active [Monogamous] : is monogamous [Menstrual Cramps] : no menstrual cramps

## 2020-12-03 ENCOUNTER — APPOINTMENT (OUTPATIENT)
Dept: OBGYN | Facility: CLINIC | Age: 35
End: 2020-12-03

## 2021-08-14 ENCOUNTER — OUTPATIENT (OUTPATIENT)
Dept: OUTPATIENT SERVICES | Facility: HOSPITAL | Age: 36
LOS: 1 days | Discharge: HOME | End: 2021-08-14

## 2021-08-14 ENCOUNTER — APPOINTMENT (OUTPATIENT)
Dept: INTERNAL MEDICINE | Facility: CLINIC | Age: 36
End: 2021-08-14
Payer: COMMERCIAL

## 2021-08-14 ENCOUNTER — NON-APPOINTMENT (OUTPATIENT)
Age: 36
End: 2021-08-14

## 2021-08-14 VITALS
BODY MASS INDEX: 23.66 KG/M2 | HEART RATE: 74 BPM | SYSTOLIC BLOOD PRESSURE: 101 MMHG | WEIGHT: 125.31 LBS | DIASTOLIC BLOOD PRESSURE: 69 MMHG | HEIGHT: 61 IN | TEMPERATURE: 98.6 F | RESPIRATION RATE: 20 BRPM

## 2021-08-14 DIAGNOSIS — Z86.2 PERSONAL HISTORY OF DISEASES OF THE BLOOD AND BLOOD-FORMING ORGANS AND CERTAIN DISORDERS INVOLVING THE IMMUNE MECHANISM: ICD-10-CM

## 2021-08-14 DIAGNOSIS — Z83.3 FAMILY HISTORY OF DIABETES MELLITUS: ICD-10-CM

## 2021-08-14 DIAGNOSIS — H66.90 OTITIS MEDIA, UNSPECIFIED, UNSPECIFIED EAR: ICD-10-CM

## 2021-08-14 DIAGNOSIS — R73.03 PREDIABETES.: ICD-10-CM

## 2021-08-14 PROCEDURE — 99204 OFFICE O/P NEW MOD 45 MIN: CPT | Mod: GC

## 2021-08-14 RX ORDER — METRONIDAZOLE 500 MG/1
500 TABLET ORAL TWICE DAILY
Qty: 14 | Refills: 1 | Status: COMPLETED | COMMUNITY
Start: 2020-06-24 | End: 2021-08-14

## 2021-08-16 DIAGNOSIS — E04.1 NONTOXIC SINGLE THYROID NODULE: ICD-10-CM

## 2021-08-16 DIAGNOSIS — R73.03 PREDIABETES: ICD-10-CM

## 2021-08-16 DIAGNOSIS — Z86.59 PERSONAL HISTORY OF OTHER MENTAL AND BEHAVIORAL DISORDERS: ICD-10-CM

## 2021-08-16 DIAGNOSIS — Z86.2 PERSONAL HISTORY OF DISEASES OF THE BLOOD AND BLOOD-FORMING ORGANS AND CERTAIN DISORDERS INVOLVING THE IMMUNE MECHANISM: ICD-10-CM

## 2021-08-16 DIAGNOSIS — H66.90 OTITIS MEDIA, UNSPECIFIED, UNSPECIFIED EAR: ICD-10-CM

## 2021-08-16 DIAGNOSIS — Z83.3 FAMILY HISTORY OF DIABETES MELLITUS: ICD-10-CM

## 2021-08-16 DIAGNOSIS — Z00.00 ENCOUNTER FOR GENERAL ADULT MEDICAL EXAMINATION WITHOUT ABNORMAL FINDINGS: ICD-10-CM

## 2021-08-18 ENCOUNTER — APPOINTMENT (OUTPATIENT)
Dept: OBGYN | Facility: CLINIC | Age: 36
End: 2021-08-18
Payer: COMMERCIAL

## 2021-08-18 ENCOUNTER — OUTPATIENT (OUTPATIENT)
Dept: OUTPATIENT SERVICES | Facility: HOSPITAL | Age: 36
LOS: 1 days | Discharge: HOME | End: 2021-08-18

## 2021-08-18 VITALS
WEIGHT: 132 LBS | HEIGHT: 61 IN | BODY MASS INDEX: 24.92 KG/M2 | DIASTOLIC BLOOD PRESSURE: 66 MMHG | SYSTOLIC BLOOD PRESSURE: 98 MMHG

## 2021-08-18 DIAGNOSIS — N75.0 CYST OF BARTHOLIN'S GLAND: ICD-10-CM

## 2021-08-18 LAB
ALBUMIN SERPL ELPH-MCNC: 4.3 G/DL
ALP BLD-CCNC: 78 U/L
ALT SERPL-CCNC: 16 U/L
ANION GAP SERPL CALC-SCNC: 12 MMOL/L
AST SERPL-CCNC: 19 U/L
BASOPHILS # BLD AUTO: 0.01 K/UL
BASOPHILS NFR BLD AUTO: 0.2 %
BILIRUB SERPL-MCNC: 0.4 MG/DL
BUN SERPL-MCNC: 9 MG/DL
CALCIUM SERPL-MCNC: 9.9 MG/DL
CHLORIDE SERPL-SCNC: 105 MMOL/L
CO2 SERPL-SCNC: 22 MMOL/L
CREAT SERPL-MCNC: 0.5 MG/DL
EOSINOPHIL # BLD AUTO: 0.2 K/UL
EOSINOPHIL NFR BLD AUTO: 3 %
ESTIMATED AVERAGE GLUCOSE: 103 MG/DL
GLUCOSE SERPL-MCNC: 98 MG/DL
HBA1C MFR BLD HPLC: 5.2 %
HCT VFR BLD CALC: 40.6 %
HGB BLD-MCNC: 13.4 G/DL
IMM GRANULOCYTES NFR BLD AUTO: 0.3 %
LYMPHOCYTES # BLD AUTO: 1.88 K/UL
LYMPHOCYTES NFR BLD AUTO: 28.3 %
MAN DIFF?: NORMAL
MCHC RBC-ENTMCNC: 30.9 PG
MCHC RBC-ENTMCNC: 33 G/DL
MCV RBC AUTO: 93.5 FL
MONOCYTES # BLD AUTO: 0.42 K/UL
MONOCYTES NFR BLD AUTO: 6.3 %
NEUTROPHILS # BLD AUTO: 4.12 K/UL
NEUTROPHILS NFR BLD AUTO: 61.9 %
PLATELET # BLD AUTO: 179 K/UL
POTASSIUM SERPL-SCNC: 4.1 MMOL/L
PROT SERPL-MCNC: 7 G/DL
RBC # BLD: 4.34 M/UL
RBC # FLD: 12.1 %
SODIUM SERPL-SCNC: 139 MMOL/L
TSH SERPL-ACNC: 1.93 UIU/ML
WBC # FLD AUTO: 6.65 K/UL

## 2021-08-18 PROCEDURE — 99395 PREV VISIT EST AGE 18-39: CPT

## 2021-08-18 RX ADMIN — NORETHINDRONE AND ETHINYL ESTRADIOL 0 MG-MCG: KIT at 00:00

## 2021-08-18 NOTE — HISTORY OF PRESENT ILLNESS
[FreeTextEntry1] : re-establish care [de-identified] : 37 y/o female with History of prediabetes , dyslipidemia , anemia,  is present for medical follow up  she c/o clogged ears and ear pain and reports feeling a thyroid nodule. She is otherwise stable ,  denies fever, chills, headache,  n/v/d/ , dizziness, Chest pain or , SOB.

## 2021-08-18 NOTE — PHYSICAL EXAM
[No Acute Distress] : no acute distress [Well-Appearing] : well-appearing [Normal Sclera/Conjunctiva] : normal sclera/conjunctiva [No JVD] : no jugular venous distention [No Respiratory Distress] : no respiratory distress  [Clear to Auscultation] : lungs were clear to auscultation bilaterally [Normal Rate] : normal rate  [Regular Rhythm] : with a regular rhythm [No Edema] : there was no peripheral edema [Soft] : abdomen soft [Non Tender] : non-tender [No CVA Tenderness] : no CVA  tenderness [No Joint Swelling] : no joint swelling [No Rash] : no rash [Non-distended] : non-distended [de-identified] : cloudy , bulging TM , otalgia

## 2021-08-18 NOTE — ASSESSMENT
[FreeTextEntry1] : 35 y/o female with History of prediabetes , dyslipidemia , anemia,  is present for medical follow up  she c/o clogged ears and ear pain and reports feeling a thyroid nodule. She is otherwise stable ,  denies fever, chills, headache,  n/v/d/ , dizziness, Chest pain or , SOB. \par \par # Thyroid nodule \par -US thyroid + parathyroid \par \par # Ear infection\par - Augmentin ABX \par \par # hx Anemia\par - check CBC , iron study \par \par # dyslipidemia \par - counseled on diet management and lifestyle modifications.\par -check lipid panel \par  \par # HCM\par - check CBC, CMP , lipid panel , hgbA1C, TSH , vit D \par - due for pap smear - referred to GYN \par - RTC in 3 months

## 2021-08-21 LAB
25(OH)D3 SERPL-MCNC: 17 NG/ML
CHOLEST SERPL-MCNC: 172 MG/DL
HCV AB SER QL: NONREACTIVE
HCV S/CO RATIO: 0.22 S/CO
HDLC SERPL-MCNC: 59 MG/DL
LDLC SERPL CALC-MCNC: 96 MG/DL
NONHDLC SERPL-MCNC: 113 MG/DL
TRIGL SERPL-MCNC: 83 MG/DL

## 2021-08-23 LAB
A VAGINAE DNA VAG QL NAA+PROBE: NORMAL
BVAB2 DNA VAG QL NAA+PROBE: NORMAL
C KRUSEI DNA VAG QL NAA+PROBE: NEGATIVE
C TRACH RRNA SPEC QL NAA+PROBE: NEGATIVE
HPV HIGH+LOW RISK DNA PNL CVX: NOT DETECTED
MEGA1 DNA VAG QL NAA+PROBE: NORMAL
N GONORRHOEA RRNA SPEC QL NAA+PROBE: NEGATIVE
T VAGINALIS RRNA SPEC QL NAA+PROBE: NEGATIVE

## 2021-08-31 LAB — CYTOLOGY CVX/VAG DOC THIN PREP: NORMAL

## 2021-09-08 ENCOUNTER — RESULT REVIEW (OUTPATIENT)
Age: 36
End: 2021-09-08

## 2021-09-08 ENCOUNTER — OUTPATIENT (OUTPATIENT)
Dept: OUTPATIENT SERVICES | Facility: HOSPITAL | Age: 36
LOS: 1 days | Discharge: HOME | End: 2021-09-08
Payer: SUBSIDIZED

## 2021-09-08 DIAGNOSIS — E04.1 NONTOXIC SINGLE THYROID NODULE: ICD-10-CM

## 2021-09-08 PROCEDURE — 76536 US EXAM OF HEAD AND NECK: CPT | Mod: 26

## 2021-11-10 ENCOUNTER — OUTPATIENT (OUTPATIENT)
Dept: OUTPATIENT SERVICES | Facility: HOSPITAL | Age: 36
LOS: 1 days | Discharge: HOME | End: 2021-11-10

## 2021-11-10 ENCOUNTER — APPOINTMENT (OUTPATIENT)
Dept: INTERNAL MEDICINE | Facility: CLINIC | Age: 36
End: 2021-11-10
Payer: COMMERCIAL

## 2021-11-10 ENCOUNTER — NON-APPOINTMENT (OUTPATIENT)
Age: 36
End: 2021-11-10

## 2021-11-10 VITALS
DIASTOLIC BLOOD PRESSURE: 68 MMHG | OXYGEN SATURATION: 98 % | HEART RATE: 78 BPM | BODY MASS INDEX: 24.7 KG/M2 | HEIGHT: 61 IN | WEIGHT: 130.8 LBS | SYSTOLIC BLOOD PRESSURE: 100 MMHG | TEMPERATURE: 98.9 F

## 2021-11-10 DIAGNOSIS — E04.1 NONTOXIC SINGLE THYROID NODULE: ICD-10-CM

## 2021-11-10 PROCEDURE — 99213 OFFICE O/P EST LOW 20 MIN: CPT | Mod: GC

## 2021-11-12 PROBLEM — E04.1 THYROID NODULE: Status: ACTIVE | Noted: 2021-08-14

## 2021-11-12 NOTE — ASSESSMENT
[FreeTextEntry1] : 35 y/o female with History of prediabetes , dyslipidemia , anemia, is present for follow up test results.  She is  stable , denies fever, chills, headache, n/v/d/ , dizziness, Chest pain or , SOB. \par \par # test results  reviewed with patient , pt stable \par \par # f/u Thyroid nodule \par -US thyroid + parathyroid w/ nodule \par - bilateral nodules, right mid pole 1.3 cm  , left upper pole 1.2 cm \par - pt stable , no further f/u at this time \par \par # hx Anemia\par - cbc  wnl , resolved \par \par # dyslipidemia \par - lipid panel wnl,  resolved \par \par # low vitamin D \par - c/w vitamin D supplement 1,000 units daily \par  \par # HCM\par - GYN annual completed  08/2021 \par - RTC in 6 months. \par

## 2021-11-12 NOTE — HISTORY OF PRESENT ILLNESS
[FreeTextEntry1] : medical follow up  [de-identified] : 37 y/o female with History of prediabetes , dyslipidemia , anemia, is present for follow up test results.  She is  stable , denies fever, chills, headache, n/v/d/ , dizziness, Chest pain or , SOB.

## 2021-11-18 DIAGNOSIS — R79.89 OTHER SPECIFIED ABNORMAL FINDINGS OF BLOOD CHEMISTRY: ICD-10-CM

## 2021-11-18 DIAGNOSIS — E04.1 NONTOXIC SINGLE THYROID NODULE: ICD-10-CM

## 2021-12-23 ENCOUNTER — NON-APPOINTMENT (OUTPATIENT)
Age: 36
End: 2021-12-23

## 2021-12-29 ENCOUNTER — APPOINTMENT (OUTPATIENT)
Dept: INTERNAL MEDICINE | Facility: CLINIC | Age: 36
End: 2021-12-29

## 2022-01-12 ENCOUNTER — APPOINTMENT (OUTPATIENT)
Dept: INTERNAL MEDICINE | Facility: CLINIC | Age: 37
End: 2022-01-12
Payer: COMMERCIAL

## 2022-01-12 ENCOUNTER — NON-APPOINTMENT (OUTPATIENT)
Age: 37
End: 2022-01-12

## 2022-01-12 ENCOUNTER — OUTPATIENT (OUTPATIENT)
Dept: OUTPATIENT SERVICES | Facility: HOSPITAL | Age: 37
LOS: 1 days | Discharge: HOME | End: 2022-01-12

## 2022-01-12 VITALS
HEIGHT: 61 IN | WEIGHT: 135 LBS | BODY MASS INDEX: 25.49 KG/M2 | TEMPERATURE: 98.3 F | HEART RATE: 80 BPM | SYSTOLIC BLOOD PRESSURE: 109 MMHG | OXYGEN SATURATION: 99 % | DIASTOLIC BLOOD PRESSURE: 75 MMHG

## 2022-01-12 DIAGNOSIS — H92.09 OTALGIA, UNSPECIFIED EAR: ICD-10-CM

## 2022-01-12 PROCEDURE — 99213 OFFICE O/P EST LOW 20 MIN: CPT | Mod: GC

## 2022-01-12 RX ORDER — AMOXICILLIN AND CLAVULANATE POTASSIUM 875; 125 MG/1; MG/1
875-125 TABLET, COATED ORAL
Qty: 14 | Refills: 0 | Status: COMPLETED | COMMUNITY
Start: 2021-08-14 | End: 2022-01-12

## 2022-01-19 NOTE — HISTORY OF PRESENT ILLNESS
[FreeTextEntry1] : Left ear pain [de-identified] : 35 y/o female with pmhx of prediabetes, DLD, anemia presents with left sided ear pain. It started 6 months ago, she denies any trauma to the ear or head, being around any loud noises, decreased hearing, ringing in the ears, and dizziness. She also has left sided headaches. She also complains of throat discomfort, she feels like there is a ball in her throat. She's been feeling this sensation for one year, she feels like food gets stuck in her throat. She also complains of a lump on the left lower side of her neck that is larger in the morning. No other complaints today.

## 2022-01-19 NOTE — PHYSICAL EXAM
[No Acute Distress] : no acute distress [Well Nourished] : well nourished [Well Developed] : well developed [Well-Appearing] : well-appearing [Normal Sclera/Conjunctiva] : normal sclera/conjunctiva [Normal Outer Ear/Nose] : the outer ears and nose were normal in appearance [Normal TMs] : both tympanic membranes were normal [Supple] : supple [Thyroid Normal, No Nodules] : the thyroid was normal and there were no nodules present [No Respiratory Distress] : no respiratory distress  [No Accessory Muscle Use] : no accessory muscle use [Clear to Auscultation] : lungs were clear to auscultation bilaterally [Normal Rate] : normal rate  [Regular Rhythm] : with a regular rhythm [Normal S1, S2] : normal S1 and S2 [No Murmur] : no murmur heard [No Edema] : there was no peripheral edema [Soft] : abdomen soft [Non Tender] : non-tender [Non-distended] : non-distended [No Masses] : no abdominal mass palpated [Normal Bowel Sounds] : normal bowel sounds [Normal Anterior Cervical Nodes] : no anterior cervical lymphadenopathy [No Focal Deficits] : no focal deficits [Normal Gait] : normal gait [Normal Affect] : the affect was normal [Normal Insight/Judgement] : insight and judgment were intact [de-identified] : No cerumen noted on exam bilaterally. [de-identified] : Left lower neck lump that is estimated 2cm x 2cm. Mobile, round, rubbery.

## 2022-01-19 NOTE — REVIEW OF SYSTEMS
[Earache] : earache [Palpitations] : palpitations [Headache] : headache [Fever] : no fever [Chills] : no chills [Fatigue] : no fatigue [Pain] : no pain [Hearing Loss] : no hearing loss [Hoarseness] : no hoarseness [Sore Throat] : no sore throat [Chest Pain] : no chest pain [Lower Ext Edema] : no lower extremity edema [Shortness Of Breath] : no shortness of breath [Wheezing] : no wheezing [Cough] : no cough [Abdominal Pain] : no abdominal pain [Nausea] : no nausea [Constipation] : no constipation [Diarrhea] : diarrhea [Vomiting] : no vomiting [Melena] : no melena [Dysuria] : no dysuria [Hematuria] : no hematuria [Joint Pain] : no joint pain [Dizziness] : no dizziness [FreeTextEntry5] : Sometimes has heart palpitations intermittently, no chest pain associated with it.  [de-identified] : See HPI

## 2022-01-19 NOTE — ASSESSMENT
[FreeTextEntry1] : 37 y/o female with pmhx of prediabetes, DLD, anemia presents with left sided ear pain. She also complains of throat discomfort and a lump on the left side of her neck.\par \par #Left sided ear pain\par - Physical exam was normal\par - Patient says the pain radiates to her head as well\par - No current infection noted on exam\par - referral for ENT given\par \par #Throat discomfort \par - No difficulty breathing or swallowing\par - referral for ENT given\par \par #Lump on left side of neck\par - Most likely reactive lymph node\par - Monitor for now\par \par #HCM\par - Refused flu vaccine\par \par Follow up in 6 months

## 2022-01-20 DIAGNOSIS — H92.09 OTALGIA, UNSPECIFIED EAR: ICD-10-CM

## 2022-02-09 ENCOUNTER — NON-APPOINTMENT (OUTPATIENT)
Age: 37
End: 2022-02-09

## 2022-02-09 ENCOUNTER — RESULT CHARGE (OUTPATIENT)
Age: 37
End: 2022-02-09

## 2022-02-09 ENCOUNTER — APPOINTMENT (OUTPATIENT)
Dept: OBGYN | Facility: CLINIC | Age: 37
End: 2022-02-09
Payer: COMMERCIAL

## 2022-02-09 ENCOUNTER — OUTPATIENT (OUTPATIENT)
Dept: OUTPATIENT SERVICES | Facility: HOSPITAL | Age: 37
LOS: 1 days | Discharge: HOME | End: 2022-02-09

## 2022-02-09 VITALS
WEIGHT: 133 LBS | SYSTOLIC BLOOD PRESSURE: 100 MMHG | DIASTOLIC BLOOD PRESSURE: 75 MMHG | BODY MASS INDEX: 25.11 KG/M2 | HEIGHT: 61 IN

## 2022-02-09 DIAGNOSIS — Z30.430 ENCOUNTER FOR INSERTION OF INTRAUTERINE CONTRACEPTIVE DEVICE: ICD-10-CM

## 2022-02-09 DIAGNOSIS — Z30.09 ENCOUNTER FOR OTHER GENERAL COUNSELING AND ADVICE ON CONTRACEPTION: ICD-10-CM

## 2022-02-09 LAB
HCG UR QL: NEGATIVE
QUALITY CONTROL: YES

## 2022-02-09 PROCEDURE — 58300 INSERT INTRAUTERINE DEVICE: CPT

## 2022-02-09 RX ORDER — LEVONORGESTREL 52 MG/1
19.5 INTRAUTERINE DEVICE INTRAUTERINE
Qty: 1 | Refills: 0 | Status: ACTIVE | COMMUNITY
Start: 2022-02-09

## 2022-02-09 NOTE — PROCEDURE
[IUD Placement] : intrauterine device (IUD) placement [Prevention of Pregnancy] : prevention of pregnancy [Abnormal Uterine Bleeding] : abnormal uterine bleeding [Risks] : risks [Benefits] : benefits [Patient] : patient [Infection] : infection [Bleeding] : bleeding [Pain] : pain [Expulsion] : expulsion [Failure] : failure [Neg Pregnancy Test] : negative pregnancy test [No Premedication] : No premedication [Tenaculum] : Tenaculum [Easy Passage] : Easy passage [Sounded to ___ cm] : sounded to [unfilled] ~Ucm [Tolerated Well] : Patient tolerated the procedure well [No Complications] : No complications [None] : None [LMPDate] : 1/15/2022 [de-identified] : Liletta: [de-identified] : [id number] [de-identified] : 03/2025 [de-identified] : 02/2029

## 2022-02-09 NOTE — PLAN
[FreeTextEntry1] : 37yo  LMP 1/15/2022, here for BC counseling, s/p Liletta IUD placement\par \par -Expulsion precautions discussed\par -f/u Vaginitis panel\par \par \par -RTC 1 month for IUD string check

## 2022-02-09 NOTE — HISTORY OF PRESENT ILLNESS
[FreeTextEntry1] : 37yo  with LMP 15, here for annual exam. Patient reports mood symptoms anticipating her periods that are bothersome to her. She is taking OCPs and reports it relieved her symptoms. Would like to stop taking them becausee of intermittent bleeding for the past month. No h/o DVT, smoking, or HTN. She is sexually active with one male partner. Denies other issues. ROS negative\par \par OB Hx: FT  x1, SAB x 3 with D&C\par GYN Hx: reports h/o HPV, denies abnormal papsmears/STDs/fibroids/ovarian cysts.\par Meds: currently none\par NKDA

## 2022-02-23 ENCOUNTER — NON-APPOINTMENT (OUTPATIENT)
Age: 37
End: 2022-02-23

## 2022-02-27 ENCOUNTER — EMERGENCY (EMERGENCY)
Facility: HOSPITAL | Age: 37
LOS: 0 days | Discharge: HOME | End: 2022-02-27
Attending: EMERGENCY MEDICINE | Admitting: EMERGENCY MEDICINE
Payer: MEDICAID

## 2022-02-27 VITALS
DIASTOLIC BLOOD PRESSURE: 74 MMHG | WEIGHT: 134.04 LBS | SYSTOLIC BLOOD PRESSURE: 122 MMHG | HEIGHT: 59.06 IN | OXYGEN SATURATION: 99 % | RESPIRATION RATE: 98 BRPM | TEMPERATURE: 100 F | HEART RATE: 84 BPM

## 2022-02-27 DIAGNOSIS — L29.9 PRURITUS, UNSPECIFIED: ICD-10-CM

## 2022-02-27 DIAGNOSIS — H57.89 OTHER SPECIFIED DISORDERS OF EYE AND ADNEXA: ICD-10-CM

## 2022-02-27 DIAGNOSIS — H10.9 UNSPECIFIED CONJUNCTIVITIS: ICD-10-CM

## 2022-02-27 DIAGNOSIS — Z88.6 ALLERGY STATUS TO ANALGESIC AGENT: ICD-10-CM

## 2022-02-27 PROCEDURE — 99283 EMERGENCY DEPT VISIT LOW MDM: CPT

## 2022-02-27 RX ORDER — POLYMYXIN B SULF/TRIMETHOPRIM 10000-1/ML
1 DROPS OPHTHALMIC (EYE)
Qty: 90 | Refills: 0
Start: 2022-02-27 | End: 2022-03-12

## 2022-02-27 NOTE — ED PROVIDER NOTE - PATIENT PORTAL LINK FT
You can access the FollowMyHealth Patient Portal offered by [facility] by registering at the following website: [email or web address]. By joining [employer]’s FollowMyHealth portal, you will also be able to view your health information using other applications (apps) compatible with our system.

## 2022-02-27 NOTE — ED PROVIDER NOTE - NSFOLLOWUPCLINICS_GEN_ALL_ED_FT
[facility]  Ophthalmolgy  [address], [address]  [place] [address]  Phone: [phone]  Fax:   Follow Up Time: 4-6 Days

## 2022-02-27 NOTE — ED PROVIDER NOTE - NSFOLLOWUPINSTRUCTIONS_ED_ALL_ED_FT
[Procedure: _________] : a [unfilled] procedure visit Conjunctivitis    Conjunctivitis is an inflammation of the clear membrane that covers the white part of your eye and the inner surface of your eyelid (conjunctiva). Symptoms include eye redness, eye pain, tearing and drainage, crusting of eyelids, swollen eyelids, and light sensitivity. Conjunctivitis may be contagious and easily spread from person to person. There are several causes of and treatment depends on the type of conjunctivitis that is suspected. Avoid touching or rubbing your eyes and wipe away any drainage gently with a warm wet washcloth. Do not wear contact lenses until the inflammation is gone – wear glasses until your health care provider says it is safe to wear contact again. Do not share towels or washcloths that may spread the infection and wash your hands frequently.    SEEK MEDICAL CARE IF YOU HAVE THE FOLLOWING SYMPTOMS: increasing pain, blurry vision, fever, facial pain/redness/swelling, worsening symptoms.

## 2022-02-27 NOTE — ED PROVIDER NOTE - ATTENDING CONTRIBUTION TO CARE
37-year-old female here for bilateral redness for the past 2 days associate with itchiness wateriness.  No fevers chills trauma discharge from the eye. Patient does not use contact lenses. On exam patient has bilateral injected conjunctiva with watery discharge noted additionally has crusting of the left eye.  Fluorescein shows no abrasion patient is neurologically intact.  Impression  Conjunctivitis Symptoms might be suggestive of allergic conjunctivitis however with green debris on lashes will treat with  Polytrim discharge outpatient follow-up

## 2022-02-27 NOTE — ED PROVIDER NOTE - PHYSICAL EXAMINATION
Vital Signs: I have reviewed the initial vital signs.  Constitutional: well-nourished, appears stated age, no acute distress.  HEENT: Airway patent, moist MM, no erythema/swelling/deformity of oral structures. EOMI, bilateral injected conjunctivae with watery discharge noted, no pain with eom, perrla.   CV: regular rate, regular rhythm, well-perfused extremities, 2+ b/l DP and radial pulses equal.  Lungs: BCTA, no increased WOB.  ABD: NTND, no guarding or rebound, no pulsatile mass, no hernias, no flank pain.   MSK: Neck supple, nontender, nl ROM, no stepoff. Chest nontender. Back nontender in TLS spine or to b/l bony structures. Ext nontender, nl rom, no deformity.   INTEG: Skin warm, dry, no rash.  NEURO: A&Ox3, moving all extremities, normal speech  PSYCH: Calm, cooperative, normal affect and interaction. Vital Signs: I have reviewed the initial vital signs.  Constitutional: well-nourished, appears stated age, no acute distress.  HEENT: Airway patent, moist MM, no erythema/swelling/deformity of oral structures. EOMI, bilateral injected conjunctivae with watery discharge noted, no pain with eom, perrla. mild crusting over left eye. no corneal abrasion, abrasion to inferior sclera on left noted.   CV: regular rate, regular rhythm, well-perfused extremities, 2+ b/l DP and radial pulses equal.  Lungs: BCTA, no increased WOB.  ABD: NTND, no guarding or rebound, no pulsatile mass, no hernias, no flank pain.   MSK: Neck supple, nontender, nl ROM, no stepoff. Chest nontender. Back nontender in TLS spine or to b/l bony structures. Ext nontender, nl rom, no deformity.   INTEG: Skin warm, dry, no rash.  NEURO: A&Ox3, moving all extremities, normal speech  PSYCH: Calm, cooperative, normal affect and interaction.

## 2022-02-27 NOTE — ED PROVIDER NOTE - CLINICAL SUMMARY MEDICAL DECISION MAKING FREE TEXT BOX
Symptoms might be suggestive of allergic conjunctivitis however with green debris on lashes will treat with  Polytrim discharge outpatient follow-up

## 2022-02-27 NOTE — ED PROVIDER NOTE - OBJECTIVE STATEMENT
37F hx dld/pre-diabetes presents with bilateral eye redness for the past several days, associated with itchiness and wateriness, denies contact lens use, fever, rash, blistering of the lips. Patient has not used anything for this yet, but has been rubbing her eyes.

## 2022-02-28 ENCOUNTER — APPOINTMENT (OUTPATIENT)
Dept: OTOLARYNGOLOGY | Facility: CLINIC | Age: 37
End: 2022-02-28
Payer: COMMERCIAL

## 2022-02-28 DIAGNOSIS — H92.09 OTALGIA, UNSPECIFIED EAR: ICD-10-CM

## 2022-02-28 PROCEDURE — 92550 TYMPANOMETRY & REFLEX THRESH: CPT

## 2022-02-28 PROCEDURE — 92557 COMPREHENSIVE HEARING TEST: CPT

## 2022-02-28 PROCEDURE — 99072 ADDL SUPL MATRL&STAF TM PHE: CPT

## 2022-02-28 PROCEDURE — 99203 OFFICE O/P NEW LOW 30 MIN: CPT | Mod: 25

## 2022-02-28 NOTE — ASSESSMENT
[FreeTextEntry1] : I reviewed, interpreted, and discussed the Audiogram done today. WNL.\par \par I explained to the patient the pathophysiology of TMJ dysfunction, causing referred otalgia. I showed the impact of an  uneven bite/occlusion. \par During painful episodes, I recommended using slightly warm compresses to relieve the spasm of the masticators muscles, eating soft food, masticating on both sides of the jaw instead of one side,  in addition to using NSAIDs. I also explained the risks of side effects related to NSAIDs including stomach ulcers and recommended gastric protection while using NSAIDs. \par I also discussed the importance of seeing the dentist to align the bite to avoid a recurrence of the problem down the road.\par \par \par \par

## 2022-02-28 NOTE — HISTORY OF PRESENT ILLNESS
[de-identified] : Patient presents today with c/o left otalgia. Has been present for a few months. Patient admits pain gives her headaches. Also causing right otalgia. No change in hearing. No tinnitus.

## 2022-02-28 NOTE — PHYSICAL EXAM
[Normal] : mucosa is normal [Midline] : trachea located in midline position [de-identified] : left tmj pain on palpation

## 2022-03-09 ENCOUNTER — OUTPATIENT (OUTPATIENT)
Dept: OUTPATIENT SERVICES | Facility: HOSPITAL | Age: 37
LOS: 1 days | Discharge: HOME | End: 2022-03-09

## 2022-03-09 ENCOUNTER — APPOINTMENT (OUTPATIENT)
Dept: OBGYN | Facility: CLINIC | Age: 37
End: 2022-03-09
Payer: COMMERCIAL

## 2022-03-09 VITALS — SYSTOLIC BLOOD PRESSURE: 110 MMHG | DIASTOLIC BLOOD PRESSURE: 74 MMHG | BODY MASS INDEX: 25.7 KG/M2 | WEIGHT: 136 LBS

## 2022-03-09 DIAGNOSIS — Z97.5 PRESENCE OF (INTRAUTERINE) CONTRACEPTIVE DEVICE: ICD-10-CM

## 2022-03-09 PROCEDURE — 99213 OFFICE O/P EST LOW 20 MIN: CPT

## 2022-03-09 NOTE — HISTORY OF PRESENT ILLNESS
[Regular Cycle Intervals] : periods have been regular [No] : Patient does not have concerns regarding sex [FreeTextEntry1] : 2/20/22

## 2022-05-04 ENCOUNTER — APPOINTMENT (OUTPATIENT)
Dept: OTOLARYNGOLOGY | Facility: CLINIC | Age: 37
End: 2022-05-04

## 2022-05-10 ENCOUNTER — APPOINTMENT (OUTPATIENT)
Dept: INTERNAL MEDICINE | Facility: CLINIC | Age: 37
End: 2022-05-10

## 2022-10-13 ENCOUNTER — OUTPATIENT (OUTPATIENT)
Dept: OUTPATIENT SERVICES | Facility: HOSPITAL | Age: 37
LOS: 1 days | Discharge: HOME | End: 2022-10-13

## 2022-10-17 ENCOUNTER — NON-APPOINTMENT (OUTPATIENT)
Age: 37
End: 2022-10-17

## 2022-10-18 DIAGNOSIS — K02.52 DENTAL CARIES ON PIT AND FISSURE SURFACE PENETRATING INTO DENTIN: ICD-10-CM

## 2022-10-19 ENCOUNTER — OUTPATIENT (OUTPATIENT)
Dept: OUTPATIENT SERVICES | Facility: HOSPITAL | Age: 37
LOS: 1 days | Discharge: HOME | End: 2022-10-19

## 2022-10-19 ENCOUNTER — APPOINTMENT (OUTPATIENT)
Dept: OBGYN | Facility: CLINIC | Age: 37
End: 2022-10-19

## 2022-10-19 VITALS — SYSTOLIC BLOOD PRESSURE: 100 MMHG | BODY MASS INDEX: 26.64 KG/M2 | DIASTOLIC BLOOD PRESSURE: 60 MMHG | WEIGHT: 141 LBS

## 2022-10-19 DIAGNOSIS — Z01.419 ENCOUNTER FOR GYNECOLOGICAL EXAMINATION (GENERAL) (ROUTINE) W/OUT ABNORMAL FINDINGS: ICD-10-CM

## 2022-10-19 PROCEDURE — 99395 PREV VISIT EST AGE 18-39: CPT

## 2022-10-19 NOTE — PHYSICAL EXAM
[Chaperone Present] : A chaperone was present in the examining room during all aspects of the physical examination [Appropriately responsive] : appropriately responsive [Oriented x3] : oriented x3 [Bartholin's Gland] : the right Bartholin's gland was normal [Normal] : normal [Uterine Adnexae] : normal [___] : no abscess [] : no inflammation [FreeTextEntry1] : excoriations over left lower vulva, otherwise normal

## 2022-10-19 NOTE — DISCUSSION/SUMMARY
[FreeTextEntry1] : 36 yo P1 with IUD in situ here for annual exam\par # Bartholin cyst\par  - recommended hot compress for 1 week\par  - if does not improve in 1 week recommended the patient make an appointment\par \par # well woman visit\par  - dose 1 of HPV quadrivalent vaccine given today\par  - RTC in 2 months for dose 2\par \par RTC in 1 year for annual or PRN

## 2022-10-19 NOTE — COUNSELING
[Contraception/ Emergency Contraception/ Safe Sexual Practices] : contraception, emergency contraception, safe sexual practices [STD (testing, results, tx)] : STD (testing, results, tx) [Vaccines] : vaccines [HPV Vaccine] : HPV Vaccine

## 2022-10-19 NOTE — HISTORY OF PRESENT ILLNESS
[FreeTextEntry1] : 38yo P1, LMP unknown, s/p Liletta insertion on 2/9/22 presents to clinic for annual exam. Her periods have been irregular and light since insertion of her IUD and she does not know the last time that she had a period. She is very happy with this birth control. 4 days ago she noticed a pimple in her vagina, except it did not have a head like folliculitis she has noticed in the past. She has continued to notice increased swelling since then and today she noted some itchiness on her labia. She is otherwise doing well. She is sexually active with one partner, they are monogamous and she does not use barrier method. She has no STI concerns. [No] : Patient does not have concerns regarding sex

## 2022-10-28 ENCOUNTER — OUTPATIENT (OUTPATIENT)
Dept: OUTPATIENT SERVICES | Facility: HOSPITAL | Age: 37
LOS: 1 days | Discharge: HOME | End: 2022-10-28

## 2022-11-02 ENCOUNTER — APPOINTMENT (OUTPATIENT)
Dept: OBGYN | Facility: CLINIC | Age: 37
End: 2022-11-02

## 2022-12-07 ENCOUNTER — APPOINTMENT (OUTPATIENT)
Dept: OBGYN | Facility: CLINIC | Age: 37
End: 2022-12-07

## 2022-12-07 ENCOUNTER — OUTPATIENT (OUTPATIENT)
Dept: OUTPATIENT SERVICES | Facility: HOSPITAL | Age: 37
LOS: 1 days | Discharge: HOME | End: 2022-12-07
Payer: MEDICAID

## 2022-12-07 VITALS — DIASTOLIC BLOOD PRESSURE: 69 MMHG | BODY MASS INDEX: 26.26 KG/M2 | SYSTOLIC BLOOD PRESSURE: 106 MMHG | WEIGHT: 139 LBS

## 2022-12-07 PROCEDURE — 99212 OFFICE O/P EST SF 10 MIN: CPT | Mod: 25

## 2023-02-14 ENCOUNTER — EMERGENCY (EMERGENCY)
Facility: HOSPITAL | Age: 38
LOS: 0 days | Discharge: HOME | End: 2023-02-15
Attending: EMERGENCY MEDICINE
Payer: MEDICAID

## 2023-02-14 VITALS
HEART RATE: 111 BPM | TEMPERATURE: 98 F | OXYGEN SATURATION: 98 % | DIASTOLIC BLOOD PRESSURE: 73 MMHG | WEIGHT: 139.99 LBS | RESPIRATION RATE: 16 BRPM | SYSTOLIC BLOOD PRESSURE: 112 MMHG

## 2023-02-14 DIAGNOSIS — F32.A DEPRESSION, UNSPECIFIED: ICD-10-CM

## 2023-02-14 DIAGNOSIS — G47.00 INSOMNIA, UNSPECIFIED: ICD-10-CM

## 2023-02-14 DIAGNOSIS — Z97.5 PRESENCE OF (INTRAUTERINE) CONTRACEPTIVE DEVICE: ICD-10-CM

## 2023-02-14 DIAGNOSIS — Z88.6 ALLERGY STATUS TO ANALGESIC AGENT: ICD-10-CM

## 2023-02-14 DIAGNOSIS — F43.29 ADJUSTMENT DISORDER WITH OTHER SYMPTOMS: ICD-10-CM

## 2023-02-14 LAB
ANION GAP SERPL CALC-SCNC: 8 MMOL/L — SIGNIFICANT CHANGE UP (ref 7–14)
ANION GAP SERPL CALC-SCNC: 9 MMOL/L — SIGNIFICANT CHANGE UP (ref 7–14)
APAP SERPL-MCNC: <5 UG/ML — LOW (ref 10–30)
APPEARANCE UR: ABNORMAL
BACTERIA # UR AUTO: ABNORMAL
BASOPHILS # BLD AUTO: 0.01 K/UL — SIGNIFICANT CHANGE UP (ref 0–0.2)
BASOPHILS NFR BLD AUTO: 0.2 % — SIGNIFICANT CHANGE UP (ref 0–1)
BILIRUB UR-MCNC: NEGATIVE — SIGNIFICANT CHANGE UP
BUN SERPL-MCNC: 7 MG/DL — LOW (ref 10–20)
BUN SERPL-MCNC: 7 MG/DL — LOW (ref 10–20)
CALCIUM SERPL-MCNC: 9.7 MG/DL — SIGNIFICANT CHANGE UP (ref 8.4–10.5)
CALCIUM SERPL-MCNC: 9.8 MG/DL — SIGNIFICANT CHANGE UP (ref 8.4–10.5)
CHLORIDE SERPL-SCNC: 100 MMOL/L — SIGNIFICANT CHANGE UP (ref 98–110)
CHLORIDE SERPL-SCNC: 101 MMOL/L — SIGNIFICANT CHANGE UP (ref 98–110)
CO2 SERPL-SCNC: 22 MMOL/L — SIGNIFICANT CHANGE UP (ref 17–32)
CO2 SERPL-SCNC: 23 MMOL/L — SIGNIFICANT CHANGE UP (ref 17–32)
COLOR SPEC: SIGNIFICANT CHANGE UP
CREAT SERPL-MCNC: 0.5 MG/DL — LOW (ref 0.7–1.5)
CREAT SERPL-MCNC: 0.5 MG/DL — LOW (ref 0.7–1.5)
DIFF PNL FLD: NEGATIVE — SIGNIFICANT CHANGE UP
EGFR: 122 ML/MIN/1.73M2 — SIGNIFICANT CHANGE UP
EGFR: 122 ML/MIN/1.73M2 — SIGNIFICANT CHANGE UP
EOSINOPHIL # BLD AUTO: 0.1 K/UL — SIGNIFICANT CHANGE UP (ref 0–0.7)
EOSINOPHIL NFR BLD AUTO: 1.5 % — SIGNIFICANT CHANGE UP (ref 0–8)
EPI CELLS # UR: 2 /HPF — SIGNIFICANT CHANGE UP (ref 0–5)
ETHANOL SERPL-MCNC: <10 MG/DL — SIGNIFICANT CHANGE UP
GLUCOSE SERPL-MCNC: 105 MG/DL — HIGH (ref 70–99)
GLUCOSE SERPL-MCNC: 110 MG/DL — HIGH (ref 70–99)
GLUCOSE UR QL: NEGATIVE — SIGNIFICANT CHANGE UP
HCT VFR BLD CALC: 42 % — SIGNIFICANT CHANGE UP (ref 37–47)
HGB BLD-MCNC: 13.6 G/DL — SIGNIFICANT CHANGE UP (ref 12–16)
HYALINE CASTS # UR AUTO: 2 /LPF — SIGNIFICANT CHANGE UP (ref 0–7)
IMM GRANULOCYTES NFR BLD AUTO: 0.2 % — SIGNIFICANT CHANGE UP (ref 0.1–0.3)
KETONES UR-MCNC: NEGATIVE — SIGNIFICANT CHANGE UP
LEUKOCYTE ESTERASE UR-ACNC: NEGATIVE — SIGNIFICANT CHANGE UP
LYMPHOCYTES # BLD AUTO: 0.92 K/UL — LOW (ref 1.2–3.4)
LYMPHOCYTES # BLD AUTO: 14.1 % — LOW (ref 20.5–51.1)
MCHC RBC-ENTMCNC: 30.6 PG — SIGNIFICANT CHANGE UP (ref 27–31)
MCHC RBC-ENTMCNC: 32.4 G/DL — SIGNIFICANT CHANGE UP (ref 32–37)
MCV RBC AUTO: 94.4 FL — SIGNIFICANT CHANGE UP (ref 81–99)
MONOCYTES # BLD AUTO: 0.56 K/UL — SIGNIFICANT CHANGE UP (ref 0.1–0.6)
MONOCYTES NFR BLD AUTO: 8.6 % — SIGNIFICANT CHANGE UP (ref 1.7–9.3)
NEUTROPHILS # BLD AUTO: 4.91 K/UL — SIGNIFICANT CHANGE UP (ref 1.4–6.5)
NEUTROPHILS NFR BLD AUTO: 75.4 % — HIGH (ref 42.2–75.2)
NITRITE UR-MCNC: NEGATIVE — SIGNIFICANT CHANGE UP
NRBC # BLD: 0 /100 WBCS — SIGNIFICANT CHANGE UP (ref 0–0)
PH UR: 7.5 — SIGNIFICANT CHANGE UP (ref 5–8)
PLATELET # BLD AUTO: 264 K/UL — SIGNIFICANT CHANGE UP (ref 130–400)
POTASSIUM SERPL-MCNC: 6.5 MMOL/L — CRITICAL HIGH (ref 3.5–5)
POTASSIUM SERPL-MCNC: SIGNIFICANT CHANGE UP MMOL/L (ref 3.5–5)
POTASSIUM SERPL-SCNC: 6.5 MMOL/L — CRITICAL HIGH (ref 3.5–5)
POTASSIUM SERPL-SCNC: SIGNIFICANT CHANGE UP MMOL/L (ref 3.5–5)
PROT UR-MCNC: SIGNIFICANT CHANGE UP
RBC # BLD: 4.45 M/UL — SIGNIFICANT CHANGE UP (ref 4.2–5.4)
RBC # FLD: 12.6 % — SIGNIFICANT CHANGE UP (ref 11.5–14.5)
RBC CASTS # UR COMP ASSIST: 2 /HPF — SIGNIFICANT CHANGE UP (ref 0–4)
SALICYLATES SERPL-MCNC: <0.3 MG/DL — LOW (ref 4–30)
SODIUM SERPL-SCNC: 130 MMOL/L — LOW (ref 135–146)
SODIUM SERPL-SCNC: 133 MMOL/L — LOW (ref 135–146)
SP GR SPEC: 1.02 — SIGNIFICANT CHANGE UP (ref 1.01–1.03)
UROBILINOGEN FLD QL: SIGNIFICANT CHANGE UP
WBC # BLD: 6.51 K/UL — SIGNIFICANT CHANGE UP (ref 4.8–10.8)
WBC # FLD AUTO: 6.51 K/UL — SIGNIFICANT CHANGE UP (ref 4.8–10.8)
WBC UR QL: 2 /HPF — SIGNIFICANT CHANGE UP (ref 0–5)

## 2023-02-14 PROCEDURE — 80048 BASIC METABOLIC PNL TOTAL CA: CPT

## 2023-02-14 PROCEDURE — 81001 URINALYSIS AUTO W/SCOPE: CPT

## 2023-02-14 PROCEDURE — 99284 EMERGENCY DEPT VISIT MOD MDM: CPT

## 2023-02-14 PROCEDURE — 36415 COLL VENOUS BLD VENIPUNCTURE: CPT

## 2023-02-14 PROCEDURE — 80307 DRUG TEST PRSMV CHEM ANLYZR: CPT

## 2023-02-14 PROCEDURE — 85025 COMPLETE CBC W/AUTO DIFF WBC: CPT

## 2023-02-14 PROCEDURE — 99285 EMERGENCY DEPT VISIT HI MDM: CPT

## 2023-02-14 NOTE — ED PROVIDER NOTE - ATTENDING APP SHARED VISIT CONTRIBUTION OF CARE
37-year-old female with past medical history of "sadness "perimenopausal, on hormone treatment for this with OB/GYN presents with worsening constant depression for about 2 months.  States that she has been crying significantly over the last several weeks.  Attributes to job and lack of sleep.  Denies suicidal ideation but wishes that she could just sleep.  No homicidal ideation or hallucinations.  Never admitted to inpatient psych.  Has never taken antidepressants.  Otherwise has some congestion and mild-year-old had discomfort the last couple days.  No cough or fever.  No neck stiffness.  On exam nontoxic, vital signs stable aside from mild tachycardia, heart regular no murmurs, lungs clear bilaterally, no focal neurological deficits, no meningismus.  Flat affect, sad, depressed, not dysregulated.  Suspect worsening depression but given her exam, feel that she would benefit from discussing further expedited treatment with psychiatry.  Medical clearance labs ordered.  Regarding headache no focal neurological deficits or signs of meningismus and no abnormal work of breathing so do not feel needs further emergent medical work-up for her recent URI symptoms.

## 2023-02-14 NOTE — ED PROVIDER NOTE - PROGRESS NOTE DETAILS
Brooke: as per telepsych pt able to go home and will be followed up with psych. telepsych doctor gave pt information on who to follow up with and faxed over information

## 2023-02-14 NOTE — ED PROVIDER NOTE - OBJECTIVE STATEMENT
37-year-old female no history presents ED for feeling of depression.  States she started working overnights and is feeling overwhelmed and stressed as well as not being able to sleep at home due to child and was keeping her awake during the day.  last time she felt this way was 17 years ago after the birth of her son.  Went to see  Who instructed her that her hormones may be the source and put her on hormonal birth control.  States that it helped with the symptoms of feeling sad and wanting to cry patient was also referred to psych at that time but never had any follow-up.  Patient had hormonal IUD placed 7 months ago.  Denies recent sick contacts, fever, chills, N, V, SOB, CP, HA.  Denies SI/HI

## 2023-02-14 NOTE — ED PROVIDER NOTE - NS ED ATTENDING STATEMENT MOD
This was a shared visit with the EDWARD. I reviewed and verified the documentation and independently performed the documented:

## 2023-02-14 NOTE — ED PROVIDER NOTE - NSFOLLOWUPCLINICS_GEN_ALL_ED_FT
[facility]  OP Mental Health  [address] [place] [id number]  Phone: [phone]  Fax:

## 2023-02-14 NOTE — ED PROVIDER NOTE - PHYSICAL EXAMINATION
VITAL SIGNS: I have reviewed nursing notes and confirm.  CONSTITUTIONAL:  in no acute distress.  SKIN: Skin exam is warm and dry, no acute rash.  HEAD: Normocephalic; atraumatic.  EYES: PERRL, EOM intact; conjunctiva and sclera clear.  ENT: No nasal discharge; airway clear.   CARD: S1, S2 normal; no murmurs, gallops, or rubs. Regular rate and rhythm.  RESP: No wheezes, rales or rhonchi. Speaking in full sentences.   ABD: Normal bowel sounds; soft; non-distended; non-tender; No rebound or guarding. No CVA tenderness.  EXT: Normal ROM. No clubbing, cyanosis or edema.  NEURO: Alert, oriented. Grossly unremarkable. No focal deficits.

## 2023-02-14 NOTE — ED PROVIDER NOTE - PATIENT PORTAL LINK FT
You can access the FollowMyHealth Patient Portal offered by [facility] by registering at the following website: [email or web address]. By joining [facility]’s FollowMyHealth portal, you will also be able to view your health information using other applications (apps) compatible with our system.

## 2023-02-14 NOTE — ED PROVIDER NOTE - NSFOLLOWUPINSTRUCTIONS_ED_ALL_ED_FT
Please follow up with psychiatry recommendations in the next 1-3 days     Depression    Depression is a mental illness that usually causes feelings of sadness, hopelessness, or helplessness. Some people with this disorder do not feel particularly sad but lose interest in doing things they used to enjoy (anhedonia). Major depressive disorder also can cause physical symptoms. It can interfere with work, school, relationships, and other normal everyday activities. If you were started on a medication make sure to take exactly as prescribed and follow up with a psychiatrist.    SEEK IMMEDIATE MEDICAL CARE IF YOU HAVE THE FOLLOWING SYMPTOMS: thoughts about hurting killing yourself, thoughts about hurting or killing somebody else, hallucinations, or worsening depression.

## 2023-02-14 NOTE — ED PROVIDER NOTE - CLINICAL SUMMARY MEDICAL DECISION MAKING FREE TEXT BOX
Authored by Dr. Stacey Veloz: s/o to me by Dr. East    Labs and EKG were ordered and reviewed. Appropriate medications for patient's presenting complaints were ordered and effects were reassessed.  Patient's records (prior hospital, ED visit, and/or nursing home notes if available) were reviewed.  Additional history was obtained from EMS, family, and/or PCP (where available).  Escalation to admission/observation was considered.  However patient feels much better and is comfortable with discharge.  Appropriate follow-up was arranged.    37F p/w depression s/o to me pending psych consult who cleared pt for outpt f/u, resources provided to pt

## 2023-02-15 VITALS
SYSTOLIC BLOOD PRESSURE: 115 MMHG | TEMPERATURE: 99 F | HEART RATE: 98 BPM | RESPIRATION RATE: 16 BRPM | OXYGEN SATURATION: 99 % | DIASTOLIC BLOOD PRESSURE: 74 MMHG

## 2023-02-15 DIAGNOSIS — G47.00 INSOMNIA, UNSPECIFIED: ICD-10-CM

## 2023-02-15 DIAGNOSIS — F43.29 ADJUSTMENT DISORDER WITH OTHER SYMPTOMS: ICD-10-CM

## 2023-02-15 PROCEDURE — 90792 PSYCH DIAG EVAL W/MED SRVCS: CPT | Mod: 95

## 2023-02-15 NOTE — ED BEHAVIORAL HEALTH NOTE - BEHAVIORAL HEALTH NOTE
==================    PRE-HOSPITAL COURSE    ===================    SOURCE:  RN and secondhand ED documentation    DETAILS: BTCM spoke with RN on first attempt. RN states that pt is denying SI/HI/AVH. Pt is calm and cooperative. RN states that pt's employer advised pt to visit the ED. Stressor includes beginning nightshifts at current job and poor sleep.   =========    ED COURSE    =========    SOURCE:  RN and secondhand ED documentation.    ARRIVAL:  Per chart and RN, patient arrived via walk-in. Per RN, patient was calm upon arrival, and cooperative with triage process.    BELONGINGS:  Per RN, patient arrived with no notable belongings. All belongings were provided to hospital security, and patient currently in a gown with a 1:1 staff member.    BEHAVIOR: RN described patient to be calm and cooperative, presenting with logical thought process, (AAOx4), presenting with depressed mood and affect, remains in good behavioral and impulse control, is not violent/aggressive. RN stated that the patient is denying SI/HI/A/VH. RN stated that there are no visible marks, bruises, or lacerations on the body. RN stated that the patient appears to be well-groomed, maintains good hygiene, and reports normal ADLs, ambulates without assistance.     TREATMENT:  Per chart and RN, patient did not require PRN medications.    VISITORS:  Per RN, there are no visitors at bedside.

## 2023-02-15 NOTE — ED BEHAVIORAL HEALTH NOTE - BEHAVIORAL HEALTH NOTE
========================     FOR EACH COLLATERAL     ========================     Collateral below has requested that the information provided remain confidential: Yes [  ] No [ X ]     Collateral below has provided information that patient is/may be unaware of: Yes [  ] No [ X ]     Patient gives permission to obtain collateral from _____:     ( X ) Yes     (  )  No     Rationale for overriding objection               (  ) Lack of capacity. Details: ________               (  ) Assessing risk of danger to self/others. Details: ________     Rationale for selecting specific collateral source               (  ) Potential to impact risk of danger to self/others and no alternative equivalent. Details: _____     NAME: Isak Cuellar      NUMBER: [phone]     RELATIONSHIP: Partner.      RELIABILITY: Somewhat reliable.      COMMENTS: Advocated pt is safe to return home.      ========================     PATIENT DEMOGRAPHICS:     ========================     HPI     BASELINE FUNCTIONING: Patient is a 37-year-old female, domiciled w/ 16-year-old son, employed at [employer], no pphx, no pmhx, no hx of IPP admissions, unknown if followed by outpatient treatment, started taking OCP recently, no hx of substance use.      DATE HPI STARTED: Unknown.      DECOMPENSATION: Patient has allegedly been feeling depressed. Collateral was unable to identify pt’s current stressors and elaborate more on her decompensation since they do not live together.      SUICIDALITY: Denied.      VIOLENCE: Denied.      SUBSTANCE: Denied.      ========================     PAST PSYCHIATRIC HISTORY     ========================     DATE PAST PSYCHIATRIC HISTORY STARTED: Unknown.      MAIN PSYCHIATRIC DIAGNOSIS: Unknown.      PSYCHIATRIC HOSPITALIZATIONS: No hx of IPP admissions.      PRIOR ILLNESS: Collateral stated he is unsure if patient sees a therapist or psychiatrist.      SUICIDALITY: Denied.      VIOLENCE: Denied.      SUBSTANCE USE: Denied.      ==============     OTHER HISTORY     ==============     CURRENT MEDICATION: Patient currently takes OCP.      MEDICAL HISTORY: No pmhx.      ALLERGIES No known allergies.      LEGAL ISSUES: No legal issues.      FIREARM ACCESS: No access.      SOCIAL HISTORY: Unknown.      FAMILY HISTORY: No family hx of psychiatric conditions.      DEVELOPMENTAL HISTORY: Unknown.      -----------------------------------------------     COVID Exposure Screen- collateral (i.e. third-party, chart review, belongings, etc; include EMS and ED staff)     ---------------------------------------------------     1. Has the patient had a COVID-19 test in the last 90 days? Unknown.     2. Has the patient tested positive for COVID-19 antibodies? Unknown.     3.Has the patient received 2 doses of the COVID-19 vaccine?  Unknown.     4. In the past 10 days, has the patient been around anyone with a positive COVID-19 test?* Unknown.     5.Has the patient been out of [place] within the past 10 days? Unknown.

## 2023-02-15 NOTE — ED BEHAVIORAL HEALTH ASSESSMENT NOTE - VIOLENCE PROTECTIVE FACTORS:
Residential stability/Relationship stability/Employment stability/Sobriety/Insight into violence risk and need for management/treatment

## 2023-02-15 NOTE — ED BEHAVIORAL HEALTH ASSESSMENT NOTE - SUMMARY
36 yo F, domiciled with son, works as an  at fast food restaurant, no medical history, psychiatric history of one episode of depression during pregnancy with son 18 years ago and made a suicide attempt, unclear if admitted, did not follow up with outpatient treatment at the time, no history of violence, no substance use, who presents for difficulty sleeping and low mood.    Patient presents with emotional distress in the setting of poor sleep due to shift changes at work. She also reports long standing difficulty with regulating her emotions and has a history of one episode of severe depression during her pregnancy, with symptoms possibly being attributed to menstrual cycle. The patient is interested in psychiatric treatment and pursuing medication management of these symptoms. Discussed referral to [facility] and alternating Melatonin and Benadryl to immediately address insomnia while also discussing making schedule changes with her supervisors at work. Patient is at low risk of suicide and protective factors mitigate risk factors at this time. Patient has no suicidal thoughts, no intent, or plan.    PLAN:    - No psychiatric contraindication to discharge  - Alternate Benadryl and Melatonin for insomnia  - Safety planning discussed of returning to ER for worsening symptoms  - Faxed outpatient resources  - Referral to [facility] behavioral health

## 2023-02-15 NOTE — ED BEHAVIORAL HEALTH ASSESSMENT NOTE - PRIOR MEDICATION SIDE EFFECTS OR ADVERSE REACTIONS
[de-identified] : Patient is here for right shoulder pain follow up. Patient is attending PT, taking Tylenol and Ibuprofen but has not noticed any improvement. He is having difficulty sleeping through the night. There has been no recent injury.  None known

## 2023-02-15 NOTE — ED BEHAVIORAL HEALTH ASSESSMENT NOTE - RISK ASSESSMENT
Protective factors are patients responsibility to family, support of family, no access to lethal means, identifies reasons for living, engaged in work or school, ability to cope with stress, frustration tolerance,  and no active SI    risk factors of no connection to outpatient treatment, depressed mood, prior suicide attempt, poor sleep

## 2023-02-15 NOTE — ED BEHAVIORAL HEALTH ASSESSMENT NOTE - HPI (INCLUDE ILLNESS QUALITY, SEVERITY, DURATION, TIMING, CONTEXT, MODIFYING FACTORS, ASSOCIATED SIGNS AND SYMPTOMS)
38 yo F, domiciled with son, works as an  at fast food restaurant, no medical history, psychiatric history of one episode of depression during pregnancy with son 18 years ago and made a suicide attempt, unclear if admitted, did not follow up with outpatient treatment at the time, no history of violence, no substance use, who presents for difficulty sleeping and low mood.    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 She reports coming in today due to having a cold and going to work where they sent her to the ER for a sick day. Says that her schedule is not consistent and she will close the store working until 1 AM and then have to open the next day at 8 AM. Says that her son has to wake up around 5AM to get ready for school and this also disrupts her sleep.     Says that she has been tired, having frequent crying spells, sad most days, headache, soreness, and irritability. Says that she feels "stuck in my emotions" and feels stressed, lacks motivation to do the things she usually enjoys. Says that she used to think the symptoms were just related to her menstrual cycle and now it is worse. Says that she has been struggling with "my emotions" since getting pregnant and moving to [place] with the father of her son from [place] where she was an international student. Says that she can't do it by herself anymore and needs help, says she plans to care for him until he is 21 and then return to [place] to live with her parents. She reports no suicidal thoughts, no passive death wishes, no HI, no access to a gun.

## 2023-02-15 NOTE — ED BEHAVIORAL HEALTH ASSESSMENT NOTE - DESCRIPTION
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

## 2023-02-16 ENCOUNTER — NON-APPOINTMENT (OUTPATIENT)
Age: 38
End: 2023-02-16

## 2023-02-23 ENCOUNTER — NON-APPOINTMENT (OUTPATIENT)
Age: 38
End: 2023-02-23

## 2023-02-23 ENCOUNTER — OUTPATIENT (OUTPATIENT)
Dept: OUTPATIENT SERVICES | Facility: HOSPITAL | Age: 38
LOS: 1 days | End: 2023-02-23
Payer: COMMERCIAL

## 2023-02-23 ENCOUNTER — APPOINTMENT (OUTPATIENT)
Dept: PSYCHIATRY | Facility: CLINIC | Age: 38
End: 2023-02-23

## 2023-02-23 DIAGNOSIS — F32.A DEPRESSION, UNSPECIFIED: ICD-10-CM

## 2023-02-23 PROCEDURE — 90791 PSYCH DIAGNOSTIC EVALUATION: CPT
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
## 2023-02-23 NOTE — PLAN
[Admit to Program     (Add Program Admission information to a new column in the Admit/Discharge Flowsheet)] : Admit to program [FreeTextEntry4] : Individual therapy/ Medication management
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
## 2023-02-23 NOTE — REASON FOR VISIT
[[facility] Provider/Facility] : [facility] Provider/Facility [Patient] : Patient [Community Based Organization] : Community Based Organization [FreeTextEntry4] : 10:40 [FreeTextEntry5] : 11:40 [FreeTextEntry1] : Depression, insomnia

## 2023-02-23 NOTE — PHYSICAL EXAM
[Cooperative] : cooperative [Depressed] : depressed [Clear] : clear [Linear/Goal Directed] : linear/goal directed [None] : none [None Reported] : none reported [Average] : average [WNL] : within normal limits [Constricted] : constricted [Not applicable] : not applicable [de-identified] : Pt appeared tearful

## 2023-02-23 NOTE — PHYSICAL EXAM
[Cooperative] : cooperative [Depressed] : depressed [Clear] : clear [Linear/Goal Directed] : linear/goal directed [None] : none [None Reported] : none reported [Average] : average [WNL] : within normal limits [Constricted] : constricted [Not applicable] : not applicable [de-identified] : Pt appeared tearful

## 2023-02-23 NOTE — HEALTH RISK ASSESSMENT
[Patient/Caregiver unclear of wishes] : , patient/caregiver unclear of wishes [I will adhere to the patient's wishes.] : I will adhere to the patient's wishes. [With Patient/Collateral] : , with patient/collateral [No, patient unwilling or unclear about wishes] : No, patient unwilling or unclear about wishes [AdvancecareDate] : 02/23/2023 [] : 02/23/2023

## 2023-02-23 NOTE — REASON FOR VISIT
[[facility] Provider/Facility] : [facility] Provider/Facility [Patient] : Patient [Community Based Organization] : Community Based Organization [FreeTextEntry4] : 10:40 [FreeTextEntry5] : 11:40 [FreeTextEntry1] : Depression, insomnia
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
## 2023-02-23 NOTE — PSYCHOSOCIAL ASSESSMENT
[None known] : None known [Other employment] : other employment [Yes (select details below)] : Have you ever experienced this type of event? Yes [has been constantly on guard, watchful, or easily startled] : has been constantly on guard, watchful, or easily startled [felt numb or detached from people, activities, or your surrounding] : has felt numb or detached from people, activities, or surroundings [35 hours or more] : 35 hours or more [Financially stable] : financially stable [None] : none [Child] : child [Good] : good [Lives with Family Member] : lives with family member [No] : Have you experienced or witnessed an event that caused or threatened to cause serious harm to you or to someone else? No [Not applicable, not in private residence] : not applicable, not in private residence [had nightmares about the event(s) or thought about the event(s) when you did not want] : did not have nightmares and/or unwanted thoughts about the events [tried hard not to think about the event(s) or went out of your way to avoid situations that reminded you of the event] : did not need to avoid thinking about events, did not need to avoid situations that might remind patient of events [felt guilty or unable to stop blaming yourself or others for the event(s) or any problems the event(s) may have caused] : has not felt guilty or unable to stop blaming self or others for event(s), or any problems the event(s) may have caused [FreeTextEntry1] : Pt is full time employee at [employer] [FreeTextEntry4] : Pt lives in studio with her son [FreeTextEntry7] : Armani Cuellar

## 2023-02-23 NOTE — SOCIAL HISTORY
[FreeTextEntry1] : Employment history [ ] Full time worker at [employer]\par \par Developmental history [None ]\par Sexual hx/identity Sexual History/ Concern (include sexual orientation and other relevant information) female, heterosexual \par \par Social supports (AA, etc.)? Armani Cuellar 15 y/o son, Joe Cuellar son's father, Zohra best friend lives in [place]\par \par Spiritual Assessment Tool - FICA\par F. What is your monique or belief? [Congregational ]\par Do you consider yourself spiritual or Anglican? [ yes]\par Is there something you believe in that gives meaning to your life? [God ]\par I: Is it important in your life? [yes ]\par What influence does it have on how you take care of yourself? Significant influence  "Since I was in my country I have dreams that God took me here for purpose" \par How have your beliefs influenced your behavior during this illness? What role do your beliefs play in regaining your health? [meditation and praying]\par C. Are you part of a spiritual or Anglican community? [Attends Methodist sometimes, meditation at home ]\par Is this of support to you and how? ["makes me feel good"]\par Is there a person or group of people you really love or who are really important to you? ["Besides my son, my friends" ]\par H. We have been discussing your belief and supports. What else gives you internal support?[Jewish and family ]\par What are your sources of hope, strength, comfort and peace? [Anglican, family "I like to be at peace with myself" ]\par What do you hold on to during difficult times? ["I try to listen to videos that empowers you during challenging times" ]\par what sustains you and keeps you going? ["I want to see my family back in [place]" ]\par A. How would you like me, your healthcare provider, to address these issues in your healthcare? [Seeking therapy and medication treatment to alleviate symptoms ]\par \par famhx\par \par Family composition: Pt report both parents, and brother live in [place]\par Family history and background [Pt denies family medical hx. Pt denies family psychiatric hx]\par Family relationship [Pt reports being very close to father. Pt reports challenging relationship with mother  ]\par Pertinent Family Medical, MH and Substance Use History including Adult Child of Alcoholic and child of substance abuse status; history of cancer and heart disease\par None reported\par BRIEF TOBACCO CESSATION INTERVNTION\par Do you Smoke?                              ? Yes            [x] ?No\par Do you want to quit?          ? Yes       ?No\par -ASK\par ·     Number of cigarettes    _____ cigars _____ pipe bowls _____ per day\par ·     Number of ST cans/ pouches per week _____\par ·     Number of years used _____\par ·     How soon after you wake up do you use tobacco?  ?within 30 minutes      ?more than 30 minutes\par ·     Previous quit attempts:  # of attempts ___ longest quit period ___ methods(s) used __________________\par How long ago was last attempt to quit  _______ years __________ months\par ·     Reasons for wanting to quit ____________________________\par -ADVISE   about the oral benefits of quitting\par -ASSESS   willingness to make a quit attempt (Stage of Change)\par           ?Precontemplation (Stop here & Reassess next visit)          ?Contemplation     ?Preparation  \par -ASSIST    (depending on stage of change)\par ·     Self-Help Pamphlets & Materials\par ·     List of local community group/individual quit programs and phone help lines\par ·     Encourage a quit date (for those who are ready)\par ·     Pharmacotherapy: Nicotine gum/ Lozenge/ Patch/ Inhaler/NS/Zyban/ Chantix\par       RX:                                                    ()\par -ARRANGE  Follow up if set a quit date (with permission)\par Quit Date: __________________________  Phone calls or visits:  ?Week 1-2  Months   ? 1   ? 3   ? 6   ?12  \par -Yearly Reassessment    ? No Changes of Smoking ?Change of Smoking Habit (Non-Smoker to Smoker/ Smoker to Non Smoker)\par (If there is change from Non Smoker to Smoker, please fill out new BRIEF TOBACCO CESSATION INTERVENTION FORM)\par Date of Yearly Reassessment :  ___________________  \par Comments:  ______________________________________________________________________________________\par _________________________________________________________________________________________________\par It is important that any pharmacotherapy prescribed or recommended be listed in the progress notes or on the intervention form and treatment plan.  \par \par       CLINICIAN SIGNATURE                                         DATE              TIME\par

## 2023-02-23 NOTE — SOCIAL HISTORY
[FreeTextEntry1] : Employment history [ ] Full time worker at [employer]\par \par Developmental history [None ]\par Sexual hx/identity Sexual History/ Concern (include sexual orientation and other relevant information) female, heterosexual \par \par Social supports (AA, etc.)? Armani Cuellar 17 y/o son, Joe Cuellar son's father, Zohra best friend lives in [place]\par \par Spiritual Assessment Tool - FICA\par F. What is your monique or belief? [Jewish ]\par Do you consider yourself spiritual or Faith? [ yes]\par Is there something you believe in that gives meaning to your life? [God ]\par I: Is it important in your life? [yes ]\par What influence does it have on how you take care of yourself? Significant influence  "Since I was in my country I have dreams that God took me here for purpose" \par How have your beliefs influenced your behavior during this illness? What role do your beliefs play in regaining your health? [meditation and praying]\par C. Are you part of a spiritual or Faith community? [Attends Temple sometimes, meditation at home ]\par Is this of support to you and how? ["makes me feel good"]\par Is there a person or group of people you really love or who are really important to you? ["Besides my son, my friends" ]\par H. We have been discussing your belief and supports. What else gives you internal support?[Adventism and family ]\par What are your sources of hope, strength, comfort and peace? [Mormon, family "I like to be at peace with myself" ]\par What do you hold on to during difficult times? ["I try to listen to videos that empowers you during challenging times" ]\par what sustains you and keeps you going? ["I want to see my family back in [place]" ]\par A. How would you like me, your healthcare provider, to address these issues in your healthcare? [Seeking therapy and medication treatment to alleviate symptoms ]\par \par famhx\par \par Family composition: Pt report both parents, and brother live in [place]\par Family history and background [Pt denies family medical hx. Pt denies family psychiatric hx]\par Family relationship [Pt reports being very close to father. Pt reports challenging relationship with mother  ]\par Pertinent Family Medical, MH and Substance Use History including Adult Child of Alcoholic and child of substance abuse status; history of cancer and heart disease\par None reported\par BRIEF TOBACCO CESSATION INTERVNTION\par Do you Smoke?                              ? Yes            [x] ?No\par Do you want to quit?          ? Yes       ?No\par -ASK\par ·     Number of cigarettes    _____ cigars _____ pipe bowls _____ per day\par ·     Number of ST cans/ pouches per week _____\par ·     Number of years used _____\par ·     How soon after you wake up do you use tobacco?  ?within 30 minutes      ?more than 30 minutes\par ·     Previous quit attempts:  # of attempts ___ longest quit period ___ methods(s) used __________________\par How long ago was last attempt to quit  _______ years __________ months\par ·     Reasons for wanting to quit ____________________________\par -ADVISE   about the oral benefits of quitting\par -ASSESS   willingness to make a quit attempt (Stage of Change)\par           ?Precontemplation (Stop here & Reassess next visit)          ?Contemplation     ?Preparation  \par -ASSIST    (depending on stage of change)\par ·     Self-Help Pamphlets & Materials\par ·     List of local community group/individual quit programs and phone help lines\par ·     Encourage a quit date (for those who are ready)\par ·     Pharmacotherapy: Nicotine gum/ Lozenge/ Patch/ Inhaler/NS/Zyban/ Chantix\par       RX:                                                    ()\par -ARRANGE  Follow up if set a quit date (with permission)\par Quit Date: __________________________  Phone calls or visits:  ?Week 1-2  Months   ? 1   ? 3   ? 6   ?12  \par -Yearly Reassessment    ? No Changes of Smoking ?Change of Smoking Habit (Non-Smoker to Smoker/ Smoker to Non Smoker)\par (If there is change from Non Smoker to Smoker, please fill out new BRIEF TOBACCO CESSATION INTERVENTION FORM)\par Date of Yearly Reassessment :  ___________________  \par Comments:  ______________________________________________________________________________________\par _________________________________________________________________________________________________\par It is important that any pharmacotherapy prescribed or recommended be listed in the progress notes or on the intervention form and treatment plan.  \par \par       CLINICIAN SIGNATURE                                         DATE              TIME\par

## 2023-03-09 ENCOUNTER — APPOINTMENT (OUTPATIENT)
Dept: PSYCHIATRY | Facility: CLINIC | Age: 38
End: 2023-03-09

## 2023-03-09 ENCOUNTER — APPOINTMENT (OUTPATIENT)
Dept: PSYCHIATRY | Facility: CLINIC | Age: 38
End: 2023-03-09
Payer: COMMERCIAL

## 2023-03-09 ENCOUNTER — OUTPATIENT (OUTPATIENT)
Dept: OUTPATIENT SERVICES | Facility: HOSPITAL | Age: 38
LOS: 1 days | End: 2023-03-09
Payer: COMMERCIAL

## 2023-03-09 DIAGNOSIS — F32.9 MAJOR DEPRESSIVE DISORDER, SINGLE EPISODE, UNSPECIFIED: ICD-10-CM

## 2023-03-09 DIAGNOSIS — F32.A DEPRESSION, UNSPECIFIED: ICD-10-CM

## 2023-03-09 PROCEDURE — 90792 PSYCH DIAG EVAL W/MED SRVCS: CPT
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
## 2023-03-12 NOTE — REASON FOR VISIT
[Patient] : Patient [FreeTextEntry4] : 10:10 am [FreeTextEntry5] : 11 am [FreeTextEntry2] : Intake [FreeTextEntry1] : Assistance for mood disorder

## 2023-03-12 NOTE — END OF VISIT
[] : Resident [Resident] : Resident [Time Spent: ___ minutes] : I have spent [unfilled] minutes of time on the encounter. [FreeTextEntry3] : Agree with written above. Pt was seen and evaluated with resident.\par Risks and benefits of lexapro were discussed. Pt denies SI/HI
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
## 2023-03-12 NOTE — SOCIAL HISTORY
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

## 2023-03-12 NOTE — PHYSICAL EXAM
[None] : none [Well groomed] : well groomed [Cooperative] : cooperative [Depressed] : depressed [Constricted] : constricted [Anxious] : anxious [Clear] : clear [Linear/Goal Directed] : linear/goal directed [Depressive] : depressive [None Reported] : none reported [Average] : average [WNL] : within normal limits

## 2023-03-15 ENCOUNTER — APPOINTMENT (OUTPATIENT)
Dept: OBGYN | Facility: CLINIC | Age: 38
End: 2023-03-15

## 2023-03-15 ENCOUNTER — OUTPATIENT (OUTPATIENT)
Dept: OUTPATIENT SERVICES | Facility: HOSPITAL | Age: 38
LOS: 1 days | End: 2023-03-15
Payer: COMMERCIAL

## 2023-03-15 ENCOUNTER — APPOINTMENT (OUTPATIENT)
Dept: OBGYN | Facility: CLINIC | Age: 38
End: 2023-03-15
Payer: COMMERCIAL

## 2023-03-15 VITALS
DIASTOLIC BLOOD PRESSURE: 73 MMHG | SYSTOLIC BLOOD PRESSURE: 107 MMHG | HEIGHT: 61 IN | WEIGHT: 142 LBS | BODY MASS INDEX: 26.81 KG/M2

## 2023-03-15 DIAGNOSIS — Z01.419 ENCOUNTER FOR GYNECOLOGICAL EXAMINATION (GENERAL) (ROUTINE) WITHOUT ABNORMAL FINDINGS: ICD-10-CM

## 2023-03-15 DIAGNOSIS — Z23 ENCOUNTER FOR IMMUNIZATION: ICD-10-CM

## 2023-03-15 PROCEDURE — 90471 IMMUNIZATION ADMIN: CPT

## 2023-03-15 PROCEDURE — 99212 OFFICE O/P EST SF 10 MIN: CPT | Mod: 25

## 2023-03-15 PROCEDURE — 99212 OFFICE O/P EST SF 10 MIN: CPT

## 2023-03-20 DIAGNOSIS — Z23 ENCOUNTER FOR IMMUNIZATION: ICD-10-CM

## 2023-03-27 ENCOUNTER — APPOINTMENT (OUTPATIENT)
Dept: INTERNAL MEDICINE | Facility: CLINIC | Age: 38
End: 2023-03-27

## 2023-04-04 ENCOUNTER — NON-APPOINTMENT (OUTPATIENT)
Age: 38
End: 2023-04-04

## 2023-04-04 ENCOUNTER — OUTPATIENT (OUTPATIENT)
Dept: OUTPATIENT SERVICES | Facility: HOSPITAL | Age: 38
LOS: 1 days | End: 2023-04-04
Payer: COMMERCIAL

## 2023-04-04 ENCOUNTER — APPOINTMENT (OUTPATIENT)
Dept: PSYCHIATRY | Facility: CLINIC | Age: 38
End: 2023-04-04

## 2023-04-04 ENCOUNTER — APPOINTMENT (OUTPATIENT)
Dept: PSYCHIATRY | Facility: CLINIC | Age: 38
End: 2023-04-04
Payer: COMMERCIAL

## 2023-04-04 DIAGNOSIS — F33.1 MAJOR DEPRESSIVE DISORDER, RECURRENT, MODERATE: ICD-10-CM

## 2023-04-04 DIAGNOSIS — F32.9 MAJOR DEPRESSIVE DISORDER, SINGLE EPISODE, UNSPECIFIED: ICD-10-CM

## 2023-04-04 PROCEDURE — ZZZZZ: CPT

## 2023-04-04 PROCEDURE — 99213 OFFICE O/P EST LOW 20 MIN: CPT | Mod: 95

## 2023-04-05 DIAGNOSIS — F32.9 MAJOR DEPRESSIVE DISORDER, SINGLE EPISODE, UNSPECIFIED: ICD-10-CM

## 2023-04-05 NOTE — HISTORY OF PRESENT ILLNESS
[FreeTextEntry1] : Patient assessed via [facility] for a total of 15 mins. \par \par Since last evaluation, pt was started on lexapro for the tx of MDD and therapist search started. Pt states that she has been feeling ok. Started lexapro 5 mg daily until completed, titrating to 10 mg daily, in the evenings, due to concern of dizziness/drowsiness as listed on the medication bottle label. Pt noticed insomnia, describing difficulty falling asleep. She was recommended to try morning dosing if she is able to tolerate medication without dizziness and drowsiness in the evening. Pt also noted that she has an irregular sleep pattern due to work schedule which includes closing late on some nights. Pt notes that she otherwise is able to get 8 hours of sleep and feels well rested. \par \par Otherwise, patient states her mood is "normal." Does not describe much change in mood since starting lexapro 10 mg and will continue to trial. Describes that harder days are coming due to upcoming menses. States that for 3-4 days, she experiences extreme sadness, crying, thoughts of what happened during her childhood/sad memories. She denies thoughts of death or suicide at this time, and currently. She is hoping that addition of therapy can help her cope during this time.
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
## 2023-04-05 NOTE — PHYSICAL EXAM
[Well groomed] : well groomed [Cooperative] : cooperative [Full] : full [Clear] : clear [Linear/Goal Directed] : linear/goal directed [None] : none [None Reported] : none reported [Average] : average [WNL] : within normal limits

## 2023-04-12 ENCOUNTER — APPOINTMENT (OUTPATIENT)
Dept: OBGYN | Facility: CLINIC | Age: 38
End: 2023-04-12

## 2023-05-03 ENCOUNTER — OUTPATIENT (OUTPATIENT)
Dept: OUTPATIENT SERVICES | Facility: HOSPITAL | Age: 38
LOS: 1 days | End: 2023-05-03
Payer: COMMERCIAL

## 2023-05-03 ENCOUNTER — APPOINTMENT (OUTPATIENT)
Dept: PSYCHIATRY | Facility: CLINIC | Age: 38
End: 2023-05-03
Payer: COMMERCIAL

## 2023-05-03 DIAGNOSIS — F32.9 MAJOR DEPRESSIVE DISORDER, SINGLE EPISODE, UNSPECIFIED: ICD-10-CM

## 2023-05-03 DIAGNOSIS — F33.1 MAJOR DEPRESSIVE DISORDER, RECURRENT, MODERATE: ICD-10-CM

## 2023-05-03 PROCEDURE — ZZZZZ: CPT

## 2023-05-03 PROCEDURE — 99214 OFFICE O/P EST MOD 30 MIN: CPT | Mod: 95

## 2023-05-04 DIAGNOSIS — F32.9 MAJOR DEPRESSIVE DISORDER, SINGLE EPISODE, UNSPECIFIED: ICD-10-CM

## 2023-05-17 ENCOUNTER — RESULT REVIEW (OUTPATIENT)
Age: 38
End: 2023-05-17

## 2023-05-17 ENCOUNTER — OUTPATIENT (OUTPATIENT)
Dept: OUTPATIENT SERVICES | Facility: HOSPITAL | Age: 38
LOS: 1 days | End: 2023-05-17
Payer: COMMERCIAL

## 2023-05-17 DIAGNOSIS — Z12.31 ENCOUNTER FOR SCREENING MAMMOGRAM FOR MALIGNANT NEOPLASM OF BREAST: ICD-10-CM

## 2023-05-17 PROCEDURE — 77063 BREAST TOMOSYNTHESIS BI: CPT | Mod: 26

## 2023-05-17 PROCEDURE — 77063 BREAST TOMOSYNTHESIS BI: CPT

## 2023-05-17 PROCEDURE — 77067 SCR MAMMO BI INCL CAD: CPT

## 2023-05-17 PROCEDURE — 77067 SCR MAMMO BI INCL CAD: CPT | Mod: 26

## 2023-05-18 DIAGNOSIS — Z12.31 ENCOUNTER FOR SCREENING MAMMOGRAM FOR MALIGNANT NEOPLASM OF BREAST: ICD-10-CM

## 2023-06-07 ENCOUNTER — OUTPATIENT (OUTPATIENT)
Dept: OUTPATIENT SERVICES | Facility: HOSPITAL | Age: 38
LOS: 1 days | End: 2023-06-07
Payer: COMMERCIAL

## 2023-06-07 ENCOUNTER — APPOINTMENT (OUTPATIENT)
Dept: PSYCHIATRY | Facility: CLINIC | Age: 38
End: 2023-06-07
Payer: COMMERCIAL

## 2023-06-07 DIAGNOSIS — F32.9 MAJOR DEPRESSIVE DISORDER, SINGLE EPISODE, UNSPECIFIED: ICD-10-CM

## 2023-06-07 DIAGNOSIS — F33.1 MAJOR DEPRESSIVE DISORDER, RECURRENT, MODERATE: ICD-10-CM

## 2023-06-07 PROCEDURE — ZZZZZ: CPT

## 2023-06-07 PROCEDURE — 99213 OFFICE O/P EST LOW 20 MIN: CPT | Mod: 95
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
## 2023-06-07 NOTE — PHYSICAL EXAM
[Well groomed] : well groomed [Cooperative] : cooperative [Euthymic] : euthymic [Depressed] : depressed [Full] : full [Clear] : clear [Linear/Goal Directed] : linear/goal directed [None] : none [None Reported] : none reported [Average] : average [WNL] : within normal limits [FreeTextEntry1] : well dressed, tired appearing, NAD [FreeTextEntry8] : improved mood, remains somewhat depressed/down

## 2023-06-07 NOTE — HISTORY OF PRESENT ILLNESS
[FreeTextEntry1] : Patient assessed via [employer] for a total of 20 mins.\par \par States that she is doing okay and that the medication has been helping. Feels as if her sad days are not as "sad" as they have been in the past - ie she is not having crying spells and does not feel extreme hopelessness and sadness as she used to. Has had more better days than sad in which she does not feel sad, like crying or angry. Though lessening, describes feeling somewhat down and shy/quiet at times. States that work is going well, has had time to sleep adequately, environment is improving, and she is feeling more comfortable with her schedule. The schedule has been changed to accommodate having a day off after she closes. Denies issues with concentration, appetite, sleep. Feels happier and as if things have improved. Denies SI. Denies nausea, headache, dizziness with use of medication.
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
## 2023-06-07 NOTE — HISTORY OF PRESENT ILLNESS
[FreeTextEntry1] : Patient assessed via [employer] for a total of 20 mins. \par \par States that she is doing okay. Worked late last night. States that she has been waking up feeling sad, waking up crying. Says that she is less talkative with her co-workers at this time and doesn't want to talk. Denies issues with motivation though describes feeling withdrawn. While she notices these differences, she says they do not impact her ability to work aside from being more quiet than usual. Denies thoughts of death and suicide.\par \par Reports adherence to medication but noticed nausea, improved when having with food and nav drink.\par Says that she was taking the medication and having with food though feels nauseous.\par \par Discussed possible benefit of increasing dose, patient agrees after education provided on RAB.
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
## 2023-06-07 NOTE — PHYSICAL EXAM
[Well groomed] : well groomed [Cooperative] : cooperative [Euthymic] : euthymic [Depressed] : depressed [Full] : full [Clear] : clear [Linear/Goal Directed] : linear/goal directed [None] : none [None Reported] : none reported [Average] : average [WNL] : within normal limits [FreeTextEntry1] : well dressed, tired appearing [FreeTextEntry8] : low mood

## 2023-06-08 DIAGNOSIS — F32.9 MAJOR DEPRESSIVE DISORDER, SINGLE EPISODE, UNSPECIFIED: ICD-10-CM

## 2023-08-02 ENCOUNTER — OUTPATIENT (OUTPATIENT)
Dept: OUTPATIENT SERVICES | Facility: HOSPITAL | Age: 38
LOS: 1 days | End: 2023-08-02
Payer: COMMERCIAL

## 2023-08-02 ENCOUNTER — RESULT REVIEW (OUTPATIENT)
Age: 38
End: 2023-08-02

## 2023-08-02 ENCOUNTER — APPOINTMENT (OUTPATIENT)
Dept: INTERNAL MEDICINE | Facility: CLINIC | Age: 38
End: 2023-08-02
Payer: COMMERCIAL

## 2023-08-02 VITALS
DIASTOLIC BLOOD PRESSURE: 84 MMHG | HEART RATE: 88 BPM | WEIGHT: 140 LBS | BODY MASS INDEX: 26.43 KG/M2 | TEMPERATURE: 97 F | HEIGHT: 61 IN | SYSTOLIC BLOOD PRESSURE: 120 MMHG | OXYGEN SATURATION: 97 %

## 2023-08-02 DIAGNOSIS — Z00.00 ENCOUNTER FOR GENERAL ADULT MEDICAL EXAMINATION WITHOUT ABNORMAL FINDINGS: ICD-10-CM

## 2023-08-02 DIAGNOSIS — Z00.00 ENCOUNTER FOR GENERAL ADULT MEDICAL EXAMINATION W/OUT ABNORMAL FINDINGS: ICD-10-CM

## 2023-08-02 DIAGNOSIS — M25.562 PAIN IN LEFT KNEE: ICD-10-CM

## 2023-08-02 PROCEDURE — 73562 X-RAY EXAM OF KNEE 3: CPT | Mod: LT

## 2023-08-02 PROCEDURE — 73562 X-RAY EXAM OF KNEE 3: CPT | Mod: 26,LT

## 2023-08-02 PROCEDURE — 99214 OFFICE O/P EST MOD 30 MIN: CPT | Mod: GC

## 2023-08-02 PROCEDURE — 99214 OFFICE O/P EST MOD 30 MIN: CPT

## 2023-08-02 NOTE — REVIEW OF SYSTEMS
[Joint Pain] : joint pain [Fever] : no fever [Chills] : no chills [Chest Pain] : no chest pain [Palpitations] : no palpitations [Lower Ext Edema] : no lower extremity edema [Shortness Of Breath] : no shortness of breath [Wheezing] : no wheezing [Cough] : no cough [Dyspnea on Exertion] : no dyspnea on exertion [Abdominal Pain] : no abdominal pain [Nausea] : no nausea [Constipation] : no constipation [Diarrhea] : diarrhea [Vomiting] : no vomiting [Heartburn] : no heartburn [Melena] : no melena [Hematuria] : no hematuria [Back Pain] : no back pain [Headache] : no headache [FreeTextEntry9] : Left knee pain

## 2023-08-02 NOTE — PHYSICAL EXAM
[No Acute Distress] : no acute distress [No Respiratory Distress] : no respiratory distress  [No Accessory Muscle Use] : no accessory muscle use [Clear to Auscultation] : lungs were clear to auscultation bilaterally [Normal Rate] : normal rate  [Regular Rhythm] : with a regular rhythm [Normal S1, S2] : normal S1 and S2 [No Edema] : there was no peripheral edema [Soft] : abdomen soft [Non Tender] : non-tender [No CVA Tenderness] : no CVA  tenderness [No Spinal Tenderness] : no spinal tenderness [Normal Affect] : the affect was normal [de-identified] : Left knee pain on palpation by patella, mild swelling of the left knee compared to right

## 2023-08-02 NOTE — HISTORY OF PRESENT ILLNESS
[de-identified] : The patient is a 38 year old female with PMH depression who presents for an initial evaluation.  Patient states she feels well, she has left knee pain that started 3 months ago, it is worse when  she sits and stands for too long.  She says it's a pressure like sensation and some days are better or worse than others.

## 2023-08-02 NOTE — ASSESSMENT
[FreeTextEntry1] : The patient is a 38 year old female with PMH depression who presents for an initial evaluation.  #Left knee pain -mild swelling and crepitus on exam -x-ray left knee -diclofenac gel ordered  #Depression -on lexapro -follows with psych  #HCM -labs to be done prior to next visit -coby birads 1 in May 2023 -follows with OBGYN -follow up in 6 months

## 2023-08-03 DIAGNOSIS — M25.562 PAIN IN LEFT KNEE: ICD-10-CM

## 2023-08-03 DIAGNOSIS — Z00.00 ENCOUNTER FOR GENERAL ADULT MEDICAL EXAMINATION WITHOUT ABNORMAL FINDINGS: ICD-10-CM

## 2023-08-03 DIAGNOSIS — F32.A DEPRESSION, UNSPECIFIED: ICD-10-CM

## 2023-08-04 ENCOUNTER — OUTPATIENT (OUTPATIENT)
Dept: OUTPATIENT SERVICES | Facility: HOSPITAL | Age: 38
LOS: 1 days | End: 2023-08-04
Payer: COMMERCIAL

## 2023-08-04 DIAGNOSIS — Z00.00 ENCOUNTER FOR GENERAL ADULT MEDICAL EXAMINATION WITHOUT ABNORMAL FINDINGS: ICD-10-CM

## 2023-08-04 LAB — TSH SERPL-ACNC: 2.22 UIU/ML

## 2023-08-04 PROCEDURE — 80053 COMPREHEN METABOLIC PANEL: CPT

## 2023-08-04 PROCEDURE — 82306 VITAMIN D 25 HYDROXY: CPT

## 2023-08-04 PROCEDURE — 80061 LIPID PANEL: CPT

## 2023-08-04 PROCEDURE — 85027 COMPLETE CBC AUTOMATED: CPT

## 2023-08-04 PROCEDURE — 83036 HEMOGLOBIN GLYCOSYLATED A1C: CPT

## 2023-08-04 PROCEDURE — 84443 ASSAY THYROID STIM HORMONE: CPT

## 2023-08-05 DIAGNOSIS — Z00.00 ENCOUNTER FOR GENERAL ADULT MEDICAL EXAMINATION WITHOUT ABNORMAL FINDINGS: ICD-10-CM

## 2023-10-18 ENCOUNTER — APPOINTMENT (OUTPATIENT)
Dept: OBGYN | Facility: CLINIC | Age: 38
End: 2023-10-18

## 2023-12-06 ENCOUNTER — OUTPATIENT (OUTPATIENT)
Dept: OUTPATIENT SERVICES | Facility: HOSPITAL | Age: 38
LOS: 1 days | End: 2023-12-06
Payer: COMMERCIAL

## 2023-12-06 ENCOUNTER — APPOINTMENT (OUTPATIENT)
Dept: INTERNAL MEDICINE | Facility: CLINIC | Age: 38
End: 2023-12-06
Payer: COMMERCIAL

## 2023-12-06 VITALS
WEIGHT: 134 LBS | DIASTOLIC BLOOD PRESSURE: 77 MMHG | TEMPERATURE: 97.9 F | BODY MASS INDEX: 25.3 KG/M2 | HEART RATE: 92 BPM | HEIGHT: 61 IN | SYSTOLIC BLOOD PRESSURE: 103 MMHG | OXYGEN SATURATION: 98 %

## 2023-12-06 DIAGNOSIS — R79.89 OTHER SPECIFIED ABNORMAL FINDINGS OF BLOOD CHEMISTRY: ICD-10-CM

## 2023-12-06 DIAGNOSIS — M25.562 PAIN IN LEFT KNEE: ICD-10-CM

## 2023-12-06 DIAGNOSIS — Z00.00 ENCOUNTER FOR GENERAL ADULT MEDICAL EXAMINATION WITHOUT ABNORMAL FINDINGS: ICD-10-CM

## 2023-12-06 LAB
25(OH)D3 SERPL-MCNC: 19 NG/ML
ALBUMIN SERPL ELPH-MCNC: 4.7 G/DL
ALP BLD-CCNC: 69 U/L
ALT SERPL-CCNC: 17 U/L
ANION GAP SERPL CALC-SCNC: 13 MMOL/L
AST SERPL-CCNC: 18 U/L
BILIRUB SERPL-MCNC: 0.4 MG/DL
BUN SERPL-MCNC: 9 MG/DL
CALCIUM SERPL-MCNC: 10 MG/DL
CHLORIDE SERPL-SCNC: 105 MMOL/L
CHOLEST SERPL-MCNC: 181 MG/DL
CO2 SERPL-SCNC: 20 MMOL/L
CREAT SERPL-MCNC: 0.5 MG/DL
EGFR: 122 ML/MIN/1.73M2
ESTIMATED AVERAGE GLUCOSE: 120 MG/DL
GLUCOSE SERPL-MCNC: 96 MG/DL
HBA1C MFR BLD HPLC: 5.8 %
HDLC SERPL-MCNC: 46 MG/DL
LDLC SERPL CALC-MCNC: 110 MG/DL
NONHDLC SERPL-MCNC: 135 MG/DL
POTASSIUM SERPL-SCNC: 4.2 MMOL/L
PROT SERPL-MCNC: 6.9 G/DL
SODIUM SERPL-SCNC: 138 MMOL/L
TRIGL SERPL-MCNC: 125 MG/DL

## 2023-12-06 PROCEDURE — 99214 OFFICE O/P EST MOD 30 MIN: CPT

## 2023-12-06 RX ORDER — ESCITALOPRAM OXALATE 20 MG/1
20 TABLET ORAL DAILY
Qty: 30 | Refills: 1 | Status: DISCONTINUED | COMMUNITY
Start: 2023-05-03 | End: 2023-12-06

## 2023-12-06 RX ORDER — CHROMIUM 200 MCG
25 MCG TABLET ORAL DAILY
Qty: 30 | Refills: 2 | Status: DISCONTINUED | COMMUNITY
Start: 2021-11-10 | End: 2023-12-06

## 2023-12-06 RX ORDER — ESCITALOPRAM OXALATE 10 MG/1
10 TABLET ORAL
Qty: 5 | Refills: 0 | Status: DISCONTINUED | COMMUNITY
Start: 2023-03-09 | End: 2023-12-06

## 2023-12-06 RX ORDER — DICLOFENAC SODIUM 1% 10 MG/G
1 GEL TOPICAL DAILY
Qty: 1 | Refills: 0 | Status: ACTIVE | COMMUNITY
Start: 2023-08-02 | End: 1900-01-01

## 2023-12-06 RX ORDER — ESCITALOPRAM OXALATE 5 MG/1
5 TABLET ORAL DAILY
Qty: 5 | Refills: 0 | Status: DISCONTINUED | COMMUNITY
Start: 2023-03-09 | End: 2023-12-06

## 2023-12-06 RX ORDER — MULTIVIT-MIN/IRON/FOLIC ACID/K 18-600-40
50 MCG CAPSULE ORAL
Qty: 30 | Refills: 2 | Status: ACTIVE | COMMUNITY
Start: 2023-12-06 | End: 1900-01-01

## 2023-12-07 DIAGNOSIS — R79.89 OTHER SPECIFIED ABNORMAL FINDINGS OF BLOOD CHEMISTRY: ICD-10-CM

## 2023-12-07 DIAGNOSIS — F32.A DEPRESSION, UNSPECIFIED: ICD-10-CM

## 2023-12-07 DIAGNOSIS — M25.562 PAIN IN LEFT KNEE: ICD-10-CM

## 2024-01-22 ENCOUNTER — APPOINTMENT (OUTPATIENT)
Dept: NUTRITION | Facility: CLINIC | Age: 39
End: 2024-01-22

## 2024-01-22 ENCOUNTER — OUTPATIENT (OUTPATIENT)
Dept: OUTPATIENT SERVICES | Facility: HOSPITAL | Age: 39
LOS: 1 days | End: 2024-01-22
Payer: COMMERCIAL

## 2024-01-22 DIAGNOSIS — Z00.00 ENCOUNTER FOR GENERAL ADULT MEDICAL EXAMINATION WITHOUT ABNORMAL FINDINGS: ICD-10-CM

## 2024-01-22 PROCEDURE — 97802 MEDICAL NUTRITION INDIV IN: CPT

## 2024-01-23 DIAGNOSIS — Z71.3 DIETARY COUNSELING AND SURVEILLANCE: ICD-10-CM

## 2024-02-28 ENCOUNTER — APPOINTMENT (OUTPATIENT)
Dept: ORTHOPEDIC SURGERY | Facility: CLINIC | Age: 39
End: 2024-02-28

## 2024-02-28 ENCOUNTER — OUTPATIENT (OUTPATIENT)
Dept: OUTPATIENT SERVICES | Facility: HOSPITAL | Age: 39
LOS: 1 days | End: 2024-02-28
Payer: COMMERCIAL

## 2024-02-28 DIAGNOSIS — Z00.00 ENCOUNTER FOR GENERAL ADULT MEDICAL EXAMINATION WITHOUT ABNORMAL FINDINGS: ICD-10-CM

## 2024-02-28 DIAGNOSIS — M22.2X2 PATELLOFEMORAL DISORDERS, LEFT KNEE: ICD-10-CM

## 2024-02-28 PROCEDURE — 99203 OFFICE O/P NEW LOW 30 MIN: CPT

## 2024-03-01 DIAGNOSIS — M25.569 PAIN IN UNSPECIFIED KNEE: ICD-10-CM
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
## 2024-03-20 ENCOUNTER — OUTPATIENT (OUTPATIENT)
Dept: OUTPATIENT SERVICES | Facility: HOSPITAL | Age: 39
LOS: 1 days | End: 2024-03-20
Payer: COMMERCIAL

## 2024-03-20 DIAGNOSIS — M22.2X2 PATELLOFEMORAL DISORDERS, LEFT KNEE: ICD-10-CM

## 2024-03-20 PROCEDURE — 99204 OFFICE O/P NEW MOD 45 MIN: CPT

## 2024-03-21 DIAGNOSIS — M22.2X2 PATELLOFEMORAL DISORDERS, LEFT KNEE: ICD-10-CM

## 2024-05-31 ENCOUNTER — NON-APPOINTMENT (OUTPATIENT)
Age: 39
End: 2024-05-31

## 2024-05-31 DIAGNOSIS — F32.A DEPRESSION, UNSPECIFIED: ICD-10-CM

## 2024-06-06 ENCOUNTER — APPOINTMENT (OUTPATIENT)
Dept: INTERNAL MEDICINE | Facility: CLINIC | Age: 39
End: 2024-06-06

## 2024-06-21 PROBLEM — F32.A DEPRESSION, UNSPECIFIED DEPRESSION TYPE: Status: ACTIVE | Noted: 2019-11-21
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
## 2024-06-21 NOTE — HISTORY OF PRESENT ILLNESS
[FreeTextEntry1] : Patient assessed via [facility] for a total of 20 mins.  States that she is doing okay and that the medication has been helping. Feels as if her sad days are not as "sad" as they have been in the past - ie she is not having crying spells and does not feel extreme hopelessness and sadness as she used to. Has had more better days than sad in which she does not feel sad, like crying or angry. Though lessening, describes feeling somewhat down and shy/quiet at times. States that work is going well, has had time to sleep adequately, environment is improving, and she is feeling more comfortable with her schedule. The schedule has been changed to accommodate having a day off after she closes. Denies issues with concentration, appetite, sleep. Feels happier and as if things have improved. Denies SI. Denies nausea, headache, dizziness with use of medication.

## 2024-11-06 ENCOUNTER — APPOINTMENT (OUTPATIENT)
Dept: OBGYN | Facility: CLINIC | Age: 39
End: 2024-11-06
Payer: COMMERCIAL

## 2024-11-06 ENCOUNTER — NON-APPOINTMENT (OUTPATIENT)
Age: 39
End: 2024-11-06

## 2024-11-06 ENCOUNTER — OUTPATIENT (OUTPATIENT)
Dept: OUTPATIENT SERVICES | Facility: HOSPITAL | Age: 39
LOS: 1 days | End: 2024-11-06
Payer: COMMERCIAL

## 2024-11-06 VITALS — DIASTOLIC BLOOD PRESSURE: 72 MMHG | BODY MASS INDEX: 25.13 KG/M2 | SYSTOLIC BLOOD PRESSURE: 110 MMHG | WEIGHT: 133 LBS

## 2024-11-06 DIAGNOSIS — Z00.00 ENCOUNTER FOR GENERAL ADULT MEDICAL EXAMINATION W/OUT ABNORMAL FINDINGS: ICD-10-CM

## 2024-11-06 DIAGNOSIS — K64.9 UNSPECIFIED HEMORRHOIDS: ICD-10-CM

## 2024-11-06 DIAGNOSIS — Z00.00 ENCOUNTER FOR GENERAL ADULT MEDICAL EXAMINATION WITHOUT ABNORMAL FINDINGS: ICD-10-CM

## 2024-11-06 PROCEDURE — 81513 NFCT DS BV RNA VAG FLU ALG: CPT

## 2024-11-06 PROCEDURE — 87661 TRICHOMONAS VAGINALIS AMPLIF: CPT

## 2024-11-06 PROCEDURE — 99213 OFFICE O/P EST LOW 20 MIN: CPT | Mod: 25

## 2024-11-06 PROCEDURE — 99459 PELVIC EXAMINATION: CPT

## 2024-11-06 PROCEDURE — 88142 CYTOPATH C/V THIN LAYER: CPT

## 2024-11-06 PROCEDURE — 87481 CANDIDA DNA AMP PROBE: CPT

## 2024-11-06 PROCEDURE — 99396 PREV VISIT EST AGE 40-64: CPT

## 2024-11-06 PROCEDURE — 87591 N.GONORRHOEAE DNA AMP PROB: CPT

## 2024-11-06 PROCEDURE — 87491 CHLMYD TRACH DNA AMP PROBE: CPT

## 2024-11-06 RX ORDER — HYDROCORTISONE 10 MG/G
1 CREAM TOPICAL
Qty: 1 | Refills: 2 | Status: ACTIVE | COMMUNITY
Start: 2024-11-06 | End: 1900-01-01

## 2024-11-07 ENCOUNTER — OUTPATIENT (OUTPATIENT)
Dept: OUTPATIENT SERVICES | Facility: HOSPITAL | Age: 39
LOS: 1 days | End: 2024-11-07
Payer: COMMERCIAL

## 2024-11-07 DIAGNOSIS — Z00.00 ENCOUNTER FOR GENERAL ADULT MEDICAL EXAMINATION WITHOUT ABNORMAL FINDINGS: ICD-10-CM

## 2024-11-07 PROCEDURE — 87624 HPV HI-RISK TYP POOLED RSLT: CPT

## 2024-11-08 DIAGNOSIS — K64.9 UNSPECIFIED HEMORRHOIDS: ICD-10-CM

## 2024-11-08 DIAGNOSIS — Z00.00 ENCOUNTER FOR GENERAL ADULT MEDICAL EXAMINATION WITHOUT ABNORMAL FINDINGS: ICD-10-CM

## 2024-11-08 LAB
BV BACTERIA RRNA VAG QL NAA+PROBE: NOT DETECTED
C GLABRATA RNA VAG QL NAA+PROBE: NOT DETECTED
C TRACH RRNA SPEC QL NAA+PROBE: NOT DETECTED
CANDIDA RRNA VAG QL PROBE: NOT DETECTED
N GONORRHOEA RRNA SPEC QL NAA+PROBE: NOT DETECTED
T VAGINALIS RRNA SPEC QL NAA+PROBE: NOT DETECTED

## 2024-11-11 LAB — HPV HIGH+LOW RISK DNA PNL CVX: NOT DETECTED

## 2024-11-12 LAB — CYTOLOGY CVX/VAG DOC THIN PREP: NORMAL

## 2024-12-04 ENCOUNTER — APPOINTMENT (OUTPATIENT)
Dept: ORTHOPEDIC SURGERY | Facility: CLINIC | Age: 39
End: 2024-12-04